# Patient Record
Sex: MALE | Race: WHITE | Employment: PART TIME | URBAN - METROPOLITAN AREA
[De-identification: names, ages, dates, MRNs, and addresses within clinical notes are randomized per-mention and may not be internally consistent; named-entity substitution may affect disease eponyms.]

---

## 2021-12-13 ENCOUNTER — HOSPITAL ENCOUNTER (OUTPATIENT)
Dept: LAB | Age: 66
Discharge: HOME | End: 2021-12-13

## 2021-12-13 ENCOUNTER — OFFICE VISIT (OUTPATIENT)
Dept: HEMATOLOGY | Age: 66
End: 2021-12-13
Payer: COMMERCIAL

## 2021-12-13 VITALS
BODY MASS INDEX: 31.14 KG/M2 | OXYGEN SATURATION: 98 % | HEIGHT: 73 IN | SYSTOLIC BLOOD PRESSURE: 108 MMHG | TEMPERATURE: 97.1 F | DIASTOLIC BLOOD PRESSURE: 72 MMHG | HEART RATE: 79 BPM | WEIGHT: 235 LBS

## 2021-12-13 DIAGNOSIS — K70.31 ALCOHOLIC CIRRHOSIS OF LIVER WITH ASCITES (HCC): Primary | ICD-10-CM

## 2021-12-13 PROBLEM — I48.0 PAROXYSMAL ATRIAL FIBRILLATION (HCC): Status: ACTIVE | Noted: 2021-12-13

## 2021-12-13 PROBLEM — K70.9 ALCOHOLIC LIVER DISEASE (HCC): Status: ACTIVE | Noted: 2021-12-13

## 2021-12-13 PROBLEM — L40.9 PSORIASIS: Status: ACTIVE | Noted: 2021-12-13

## 2021-12-13 PROBLEM — K74.60 CIRRHOSIS (HCC): Status: ACTIVE | Noted: 2021-12-13

## 2021-12-13 PROBLEM — F10.11 ALCOHOL ABUSE, IN REMISSION: Status: ACTIVE | Noted: 2021-12-13

## 2021-12-13 PROBLEM — I85.00 ESOPHAGEAL VARICES (HCC): Status: ACTIVE | Noted: 2021-12-13

## 2021-12-13 PROBLEM — Z79.01 CHRONIC ANTICOAGULATION: Status: ACTIVE | Noted: 2021-12-13

## 2021-12-13 PROBLEM — R18.8 ASCITES: Status: ACTIVE | Noted: 2021-12-13

## 2021-12-13 LAB
AMMONIA PLAS-SCNC: 131 UMOL/L (ref 11–32)
SENTARA SPECIMEN COL,SENBCF: NORMAL

## 2021-12-13 PROCEDURE — 99001 SPECIMEN HANDLING PT-LAB: CPT

## 2021-12-13 PROCEDURE — 99205 OFFICE O/P NEW HI 60 MIN: CPT | Performed by: INTERNAL MEDICINE

## 2021-12-13 PROCEDURE — 82140 ASSAY OF AMMONIA: CPT

## 2021-12-13 RX ORDER — FUROSEMIDE 40 MG/1
80 TABLET ORAL DAILY
Qty: 90 TABLET | Refills: 3
Start: 2021-12-13 | End: 2022-02-02

## 2021-12-13 RX ORDER — APIXABAN 5 MG/1
TABLET, FILM COATED ORAL
COMMUNITY
Start: 2021-12-12

## 2021-12-13 RX ORDER — PANTOPRAZOLE SODIUM 20 MG/1
20 TABLET, DELAYED RELEASE ORAL DAILY
COMMUNITY

## 2021-12-13 RX ORDER — NADOLOL 40 MG/1
40 TABLET ORAL DAILY
COMMUNITY
End: 2022-01-23

## 2021-12-13 RX ORDER — SPIRONOLACTONE 25 MG/1
50 TABLET ORAL
COMMUNITY
End: 2021-12-13

## 2021-12-13 RX ORDER — FUROSEMIDE 40 MG/1
80 TABLET ORAL
COMMUNITY
End: 2021-12-13 | Stop reason: SDUPTHER

## 2021-12-13 RX ORDER — USTEKINUMAB 90 MG/ML
INJECTION, SOLUTION SUBCUTANEOUS
COMMUNITY
Start: 2021-12-09

## 2021-12-13 RX ORDER — SILDENAFIL 100 MG/1
TABLET, FILM COATED ORAL
COMMUNITY
Start: 2021-08-12

## 2021-12-13 RX ORDER — SPIRONOLACTONE 100 MG/1
200 TABLET, FILM COATED ORAL DAILY
Qty: 180 TABLET | Refills: 3 | Status: SHIPPED | OUTPATIENT
Start: 2021-12-13 | End: 2022-01-23

## 2021-12-13 RX ORDER — FLECAINIDE ACETATE 100 MG/1
50 TABLET ORAL EVERY 12 HOURS
COMMUNITY

## 2021-12-13 NOTE — PROGRESS NOTES
[address]      Edith Peabody, MD, Ralph Duke, Yelena Dorado MD, MPH      JL Devi-JULISSA Hassan, [facility]-BC     Wanda Vick, [facility]   Yulia Johnston, FNP-C    Horacio Biswas, [facility]       Beverly Meneses [facility] [place] 136    at [address], [address] [place] [address], [facility] 22.    [phone]    FAX: [address] [place] [address] Avenue    at [facility]    [address], [address]    [address] [facility], [address]    [phone]    FAX: [phone]           Patient Care Team:  Camilla Aguirre MD as PCP - General (Internal Medicine)      Problem List  Date Reviewed: 12/13/2021          Codes Class Noted    Alcoholic liver disease ([facility] 75.) ICD-10-CM: K70.9  ICD-9-CM: 571.3  12/13/2021        Cirrhosis ([facility] ICD-10-CM: K74.60  ICD-9-CM: 571.5  12/13/2021        Ascites ICD-10-CM: R18.8  ICD-9-CM: 789.59  12/13/2021        Esophageal varices ([facility] ICD-10-CM: I85.00  ICD-9-CM: 456.1  12/13/2021        Psoriasis ICD-10-CM: L40.9  ICD-9-CM: 696.1  12/13/2021        Paroxysmal atrial fibrillation (HCC) ICD-10-CM: I48.0  ICD-9-CM: 427.31  12/13/2021        Alcohol abuse, in remission ICD-10-CM: F10.11  ICD-9-CM: 305.03  12/13/2021        Chronic anticoagulation ICD-10-CM: Z79.01  ICD-9-CM: V58.61  12/13/2021                The clinicians listed above have asked me to see Saniya Howell in consultation regarding management of cirrhosis secondary to alcohol. All medical records sent by the referring physicians were reviewed including imaging studies     The patient is a 61 y.o.  male who was found to have chronic liver disease and cirrhosis in 3/2018 when he underwent liver biopsy. Serologic evaluation for markers of chronic liver disease has either not been performed or the results are not available.       The patient has developed the following complications of cirrhosis: esophageal varices, ascites,     The patient has the following symptoms which could be attributed to the liver disorder:    fatigue,   occasional pain in the right side over the liver,   swelling of the abdomen,     The patient is not experiencing the following symptoms which are commonly seen in this liver disorder:   problems concentrating,   swelling of the lower extremities,   hematemesis,   hematochezia. The patient has Mild limitations in functional activities which can be attributed to the liver disease       ASSESSMENT AND PLAN:  Cirrhosis  The diagnosis of cirrhosis is based upon imaging, laboratory studies, complications of cirrhosis. Cirrhosis is secondary to alcohol. Have performed laboratory testing to monitor liver function and degree of liver injury. This included BMP, hepatic panel, CBC with platelet count, INR. AST is elevated. ALT is normal.  ALP is elevated. Total bilirubin is mildly elevated. Serum albumin is depressed. INR is prolonged. The platelet count is depressed. The CTP is 9. Child class B. The MELD score is 17. Serologic testing for causes of chronic liver disease was ordered. All was negative     Elevation in Ferritin  There is an elevation in ferritin with Elevated iron saturation. It is unlikely that the patient has hemochromatosis or is a carrier for an HFE gene. Will order HFE genetic testing. Suspect the elevation in ferritin is secondary to alcohol use and will come down to normal with abstinence. Ascites   Ascites developed for the first time in 3/2021. Ascites is persistent despite the current dose of diuretics. Paracentesis is being performed every 2 weeks. Will increase the dose of diuretics to step 2. The patient was counseled regarding the need to maintain sodium restriction and the types of foods containing high amounts of sodium to be avoided.     Lower extremity edema  Edema is persistent despite current dose of diuretics. Will continue the current dose of diuretics at step 2. Screening for Esophageal varices   The patient has small esophageal varices without prior bleeding. The last EGD to assess for varices was performed in 8/2021. Will schedule for EGD to assess for varices in 2/2022  The patient is on a beta-blocker to reduce the risk of variceal hemorrhage. Will continue this medication until varices have been eradicated. Hepatic encephalopathy   Overt HE has not developed to date. There is no reason for treatment with lactulose of xifaxan    Thrombocytopenia   This is secondary to cirrhosis. There is no evidence of overt bleeding. No treatment is required. The platelet count is adequate for the patient to undergo procedures without the need for platelet transfusion or platelet growth factors. Screening for Hepatocellular Carcinoma  [address] screening has not been not been performed since 3/2021. AFP was ordered today and ultrasound will be scheduled. Treatment of other medical problems in patients with chronic liver disease  There are no contraindications for the patient to take most medications that are necessary for treatment of other medical issues. The patient has cirrhrosis and should avoid taking NSAIDs which are associated with a higher rate of developing TOMA. The patient can take any medications utilized for treatment of DM, statins to treat hypercholesterolemia    Counseling for alcohol in patients with chronic liver disease  The patient was counseled regarding alcohol consumption and the effect of alcohol on chronic liver disease. The patient has not consumed alcohol since 3/2021. Osteoporosis  The risk of osteoporosis is increased in patients with cirrhosis. DEXA bone density to assess for osteoporosis has not been performed. This should be ordered by the patients primary care physician.     Vaccinations Vaccination for viral hepatitis A and B is recommended since the patient has no serologic evidence of previous exposure or vaccination with immunity. The patient has received 2 doses of COVID-19 vaccine. Routine vaccinations against other bacterial and viral agents can be performed as indicated. Annual flu vaccination should be administered if indicated. ALLERGIES  Not on File    MEDICATIONS  Current Outpatient Medications   Medication Sig    Eliquis 5 mg tablet     flecainide (TAMBOCOR) 100 mg tablet Take 50 mg by mouth every twelve (12) hours.  furosemide (LASIX) 40 mg tablet Take 80 mg by mouth.  nadoloL (CORGARD) 40 mg tablet Take 40 mg by mouth daily.  pantoprazole (PROTONIX) 20 mg tablet Take 20 mg by mouth daily.  sildenafil citrate (VIAGRA) 100 mg tablet take 1 tablet by mouth 1 hour prior to intercourse    spironolactone (ALDACTONE) 25 mg tablet Take 50 mg by mouth.  ustekinaumab 90 mg/mL injection      No current facility-administered medications for this visit. SYSTEM REVIEW NOT RELATED TO LIVER DISEASE OR REVIEWED ABOVE:  Constitution systems: Negative for fever, chills, weight gain, weight loss. Eyes: Negative for visual changes. ENT: Negative for sore throat, painful swallowing. Respiratory: Negative for cough, hemoptysis, SOB. Cardiology: Negative for chest pain, palpitations. GI:  Negative for constipation or diarrhea. : Negative for urinary frequency, dysuria, hematuria, nocturia. Skin: Negative for rash. Hematology: Negative for easy bruising, blood clots. Musculo-skelatal: Negative for back pain, muscle pain, weakness. Neurologic: Negative for headaches, dizziness, vertigo, memory problems not related to HE. Psychology: Negative for anxiety, depression. FAMILY HISTORY:  The father  of alcoholism. The mother  of pancreas cancer. There is no family history of liver disease. SOCIAL HISTORY:  The patient is . The patient has 2 children, 3 stepchildren,   The patient has never used tobacco products. The patient has previously consumed alcohol in excess. The patient has been abstinent from alcohol since 3/2021. The patient currently works full time as  for [email or web address]. PHYSICAL EXAMINATION:  Visit Vitals  /72   Pulse 79   Temp 97.1 °F (36.2 °C) (Tympanic)   Ht 6' 1\" (1.854 m)   Wt 235 lb (106.6 kg)   SpO2 98%   BMI 31.00 kg/m²     General: No acute distress. Eyes: Sclera anicteric. ENT: No oral lesions. Thyroid normal.  Nodes: No adenopathy. Skin: No spider angiomata. No jaundice. No palmar erythema. Respiratory: Lungs clear to auscultation. Cardiovascular: Regular heart rate. No murmurs. No JVD. Abdomen: Soft non-tender. Liver size normal to percussion/palpation. Spleen not palpable. No obvious ascites. Extremities: No edema. No muscle wasting. No gross arthritic changes. Neurologic: Alert and oriented. Cranial nerves grossly intact. No asterixis.     LABORATORY STUDIES:  [facility] [place] of [address] [place] [address] 12/13/2021   WBC 4.0 - 11.0 K/uL 8.6   ANC 1.8 - 7.7 K/uL 5.7   HGB 13.1 - 17.2 g/dL 13.3    - 440 K/uL 101 (L)   INR 0.89 - 1.29 1.27   AST 10 - 37 U/L 90 (H)   ALT 5 - 40 U/L 39   Alk Phos 40 - 125 U/L 305 (H)   Bili, Total 0.2 - 1.2 mg/dL 2.3 (H)   Bili, Direct 0.0 - 0.3 mg/dL 1.1 (H)   Albumin 3.5 - 5.0 g/dL 3.2 (L)   BUN 6 - 22 mg/dL 10   Creat 0.8 - 1.6 mg/dL 0.7 (L)   Na 133 - 145 mmol/L 132 (L)   K 3.5 - 5.5 mmol/L 4.1   Cl 98 - 110 mmol/L 96 (L)   CO2 20 - 32 mmol/L 25   Glucose 70 - 99 mg/dL 131 (H)   Ammonia 11 - 32 UMOL/L 131 (H)     SEROLOGIES:  Serologies Latest Ref Rng & Units 12/13/2021   Hep A Ab, Total Negative Negative   Hep B Surface Ag None Detec None Detected   Hep B Core Ab, Total None Detec None Detected   Hep B Surface Ab None Detec None Detected   Ferritin 22 - 322 ng/mL 507 (H)   Iron % Saturation 20 - 50 % 51 (H)   C-ANCA Neg:<1:20 titer <1:20   ANCA Neg:<1:20 titer <1:20   ASMCA 0 - 19 Units 16   M2 Ab 0.0 - 20.0 Units <20.0   Ceruloplasmin 16.0 - 31.0 mg/dL 26.9   Alpha-1 antitrypsin level 101 - 187 mg/dL 184     LIVER HISTOLOGY:  Not available or performed    ENDOSCOPIC PROCEDURES:  8/2021. EGD by Dr Alejandra Walters. Small esophageal varices. NO banding. RADIOLOGY:  3/2021. CT scan abdomen with IV contrast.  Changes consistent with cirrhosis. No liver mass lesions. No dilated bile ducts. Small ascites. OTHER TESTING:  Not available or performed    FOLLOW-UP:  All of the issues listed above in the Assessment and Plan were discussed with the patient. All questions were answered. The patient expressed a clear understanding of the above. [address] [place] [address] in 4 weeks to review all data and determine the treatment plan.       Wolf Leiva MD  [facility] of [address], [address] [facility] 22.  [phone]  [address] [place] [address]

## 2021-12-13 NOTE — Clinical Note
12/25/2021    Patient: Augustus Ibrahim   YOB: 1955   Date of Visit: 12/13/2021     Kelsey Da Silva, [address] [address]  Via Fax: [phone]    Dear Kelsey Da Silva MD,      Thank you for referring Mr. Talya Schmidt to [address] [place] [address],[address] for evaluation. My notes for this consultation are attached. If you have questions, please do not hesitate to call me. I look forward to following your patient along with you.       Sincerely,    Tyler Batista MD

## 2021-12-14 LAB
A-G RATIO,AGRAT: 0.9 RATIO (ref 1.1–2.6)
ABSOLUTE LYMPHOCYTE COUNT, 10803: 1.3 K/UL (ref 1–4.8)
ALBUMIN SERPL-MCNC: 3.2 G/DL (ref 3.5–5)
ALP SERPL-CCNC: 305 U/L (ref 40–125)
ALT SERPL-CCNC: 39 U/L (ref 5–40)
ANION GAP SERPL CALC-SCNC: 11 MMOL/L (ref 3–15)
AST SERPL W P-5'-P-CCNC: 90 U/L (ref 10–37)
BASOPHILS # BLD: 0.1 K/UL (ref 0–0.2)
BASOPHILS NFR BLD: 1 % (ref 0–2)
BILIRUB SERPL-MCNC: 2.3 MG/DL (ref 0.2–1.2)
BILIRUBIN, DIRECT,CBIL: 1.1 MG/DL (ref 0–0.3)
BUN SERPL-MCNC: 10 MG/DL (ref 6–22)
CALCIUM SERPL-MCNC: 8.7 MG/DL (ref 8.4–10.5)
CHLORIDE SERPL-SCNC: 96 MMOL/L (ref 98–110)
CO2 SERPL-SCNC: 25 MMOL/L (ref 20–32)
CREAT SERPL-MCNC: 0.7 MG/DL (ref 0.8–1.6)
EOSINOPHIL # BLD: 0.4 K/UL (ref 0–0.5)
EOSINOPHIL NFR BLD: 5 % (ref 0–6)
ERYTHROCYTE [DISTWIDTH] IN BLOOD BY AUTOMATED COUNT: 14.5 % (ref 10–15.5)
FE % SATURATION,PSAT: 51 % (ref 20–50)
FERRITIN SERPL-MCNC: 507 NG/ML (ref 22–322)
GFRAA, 66117: >60
GFRNA, 66118: >60
GLOBULIN,GLOB: 3.5 G/DL (ref 2–4)
GLUCOSE SERPL-MCNC: 131 MG/DL (ref 70–99)
GRANULOCYTES,GRANS: 66 % (ref 40–75)
HBV SURFACE AB SER RIA-ACNC: NORMAL
HCT VFR BLD AUTO: 39 % (ref 39.3–51.6)
HCV AB SER IA-ACNC: NORMAL
HEP A AB,IGM, 006734: NORMAL
HEP B CORE AB, TOTAL, 006718: NORMAL
HEP B SURFACE AG SCRN, 006510: NORMAL
HGB BLD-MCNC: 13.3 G/DL (ref 13.1–17.2)
INR PPP: 1.27 (ref 0.89–1.29)
IRON,IRN: 110 MCG/DL (ref 45–160)
LYMPHOCYTES, LYMLT: 15 % (ref 20–45)
MCH RBC QN AUTO: 37 PG (ref 26–34)
MCHC RBC AUTO-ENTMCNC: 34 G/DL (ref 31–36)
MCV RBC AUTO: 107 FL (ref 80–95)
MONOCYTES # BLD: 1 K/UL (ref 0.1–1)
MONOCYTES NFR BLD: 12 % (ref 3–12)
NEUTROPHILS # BLD AUTO: 5.7 K/UL (ref 1.8–7.7)
PLATELET # BLD AUTO: 101 K/UL (ref 140–440)
PMV BLD AUTO: 10.8 FL (ref 9–13)
POTASSIUM SERPL-SCNC: 4.1 MMOL/L (ref 3.5–5.5)
PROT SERPL-MCNC: 6.7 G/DL (ref 6.2–8.1)
PROTHROMBIN TIME: 13.2 SEC (ref 9–13)
RBC # BLD AUTO: 3.63 M/UL (ref 3.8–5.8)
SODIUM SERPL-SCNC: 132 MMOL/L (ref 133–145)
TIBC,TIBC: 216 MCG/DL (ref 228–428)
UIBC SERPL-MCNC: 106 MCG/DL (ref 110–370)
WBC # BLD AUTO: 8.6 K/UL (ref 4–11)

## 2021-12-15 LAB
ACTIN (SMOOTH MUSCLE) ANTIBODY, 006644: 16 UNITS (ref 0–19)
AFP, SERUM, 141303: 5 NG/ML (ref 0–8)
AFP-L3%, SERUM, 141302: 7.3 % (ref 0–9.9)
ALPHA-1 ANTITRYPSIN,A1ATR: 184 MG/DL (ref 101–187)
ANA SCREEN, 8017: NEGATIVE
ATYPICAL PANCA: NORMAL TITER
CERULOPLASMIN,CERUL: 26.9 MG/DL (ref 16–31)
CYTOPLASMIC (C-ANCA), 162400: NORMAL TITER
HEP A AB, TOTAL, 006726: NEGATIVE
MICHOCHONDRIAL (M2) AB, 006652: <20 UNITS (ref 0–20)
PERINULCEAR (P-ANCA), 13235: NORMAL TITER

## 2022-01-23 ENCOUNTER — HOSPITAL ENCOUNTER (EMERGENCY)
Age: 67
Discharge: HOME | End: 2022-01-23
Attending: EMERGENCY MEDICINE | Admitting: HOSPITALIST
Payer: COMMERCIAL

## 2022-01-23 VITALS
HEART RATE: 90 BPM | HEIGHT: 74 IN | TEMPERATURE: 98.4 F | SYSTOLIC BLOOD PRESSURE: 130 MMHG | BODY MASS INDEX: 30.16 KG/M2 | DIASTOLIC BLOOD PRESSURE: 65 MMHG | RESPIRATION RATE: 16 BRPM | OXYGEN SATURATION: 98 % | WEIGHT: 235 LBS

## 2022-01-23 DIAGNOSIS — E87.1 ACUTE HYPONATREMIA: ICD-10-CM

## 2022-01-23 DIAGNOSIS — K70.11 ASCITES DUE TO CHRONIC ALCOHOLIC HEPATITIS: Primary | ICD-10-CM

## 2022-01-23 LAB
ALBUMIN SERPL-MCNC: 2.4 G/DL (ref 3.4–5)
ALBUMIN/GLOB SERPL: 0.7 {RATIO} (ref 0.8–1.7)
ALP SERPL-CCNC: 233 U/L (ref 45–117)
ALT SERPL-CCNC: 58 U/L (ref 16–61)
AMMONIA PLAS-SCNC: 73 UMOL/L (ref 11–32)
ANION GAP SERPL CALC-SCNC: 7 MMOL/L (ref 3–18)
AST SERPL-CCNC: 83 U/L (ref 10–38)
BASOPHILS # BLD: 0.1 K/UL (ref 0–0.1)
BASOPHILS NFR BLD: 1 % (ref 0–2)
BILIRUB SERPL-MCNC: 4.1 MG/DL (ref 0.2–1)
BUN SERPL-MCNC: 11 MG/DL (ref 7–18)
BUN/CREAT SERPL: 13 (ref 12–20)
CALCIUM SERPL-MCNC: 7.8 MG/DL (ref 8.5–10.1)
CHLORIDE SERPL-SCNC: 94 MMOL/L (ref 100–111)
CO2 SERPL-SCNC: 21 MMOL/L (ref 21–32)
CREAT SERPL-MCNC: 0.83 MG/DL (ref 0.6–1.3)
DIFFERENTIAL METHOD BLD: ABNORMAL
EOSINOPHIL # BLD: 0.5 K/UL (ref 0–0.4)
EOSINOPHIL NFR BLD: 6 % (ref 0–5)
ERYTHROCYTE [DISTWIDTH] IN BLOOD BY AUTOMATED COUNT: 14.8 % (ref 11.6–14.5)
GLOBULIN SER CALC-MCNC: 3.6 G/DL (ref 2–4)
GLUCOSE SERPL-MCNC: 230 MG/DL (ref 74–99)
HCT VFR BLD AUTO: 32 % (ref 36–48)
HGB BLD-MCNC: 11.3 G/DL (ref 13–16)
IMM GRANULOCYTES # BLD AUTO: 0.2 K/UL (ref 0–0.04)
IMM GRANULOCYTES NFR BLD AUTO: 2 % (ref 0–0.5)
INR PPP: 1.4 (ref 0.8–1.2)
LIPASE SERPL-CCNC: 315 U/L (ref 73–393)
LYMPHOCYTES # BLD: 0.9 K/UL (ref 0.9–3.6)
LYMPHOCYTES NFR BLD: 11 % (ref 21–52)
MAGNESIUM SERPL-MCNC: 1.8 MG/DL (ref 1.6–2.6)
MCH RBC QN AUTO: 36.9 PG (ref 24–34)
MCHC RBC AUTO-ENTMCNC: 35.3 G/DL (ref 31–37)
MCV RBC AUTO: 104.6 FL (ref 78–100)
MONOCYTES # BLD: 1 K/UL (ref 0.05–1.2)
MONOCYTES NFR BLD: 12 % (ref 3–10)
NEUTS SEG # BLD: 5.3 K/UL (ref 1.8–8)
NEUTS SEG NFR BLD: 68 % (ref 40–73)
NRBC # BLD: 0 K/UL (ref 0–0.01)
NRBC BLD-RTO: 0 PER 100 WBC
PLATELET # BLD AUTO: 85 K/UL (ref 135–420)
PLATELET COMMENTS,PCOM: ABNORMAL
PMV BLD AUTO: 9.8 FL (ref 9.2–11.8)
POTASSIUM SERPL-SCNC: 4.5 MMOL/L (ref 3.5–5.5)
PROT SERPL-MCNC: 6 G/DL (ref 6.4–8.2)
PROTHROMBIN TIME: 16.8 SEC (ref 11.5–15.2)
RBC # BLD AUTO: 3.06 M/UL (ref 4.35–5.65)
RBC MORPH BLD: ABNORMAL
SODIUM SERPL-SCNC: 122 MMOL/L (ref 136–145)
WBC # BLD AUTO: 8 K/UL (ref 4.6–13.2)

## 2022-01-23 PROCEDURE — 82140 ASSAY OF AMMONIA: CPT

## 2022-01-23 PROCEDURE — 80053 COMPREHEN METABOLIC PANEL: CPT

## 2022-01-23 PROCEDURE — 83690 ASSAY OF LIPASE: CPT

## 2022-01-23 PROCEDURE — 65270000029 HC RM PRIVATE

## 2022-01-23 PROCEDURE — 85610 PROTHROMBIN TIME: CPT

## 2022-01-23 PROCEDURE — 85025 COMPLETE CBC W/AUTO DIFF WBC: CPT

## 2022-01-23 PROCEDURE — 83735 ASSAY OF MAGNESIUM: CPT

## 2022-01-23 PROCEDURE — 99283 EMERGENCY DEPT VISIT LOW MDM: CPT

## 2022-01-23 RX ORDER — ACETAMINOPHEN 650 MG/1
650 SUPPOSITORY RECTAL
Status: DISCONTINUED | OUTPATIENT
Start: 2022-01-23 | End: 2022-01-23 | Stop reason: HOSPADM

## 2022-01-23 RX ORDER — ALBUMIN HUMAN 250 G/1000ML
25 SOLUTION INTRAVENOUS EVERY 6 HOURS
Status: DISCONTINUED | OUTPATIENT
Start: 2022-01-23 | End: 2022-01-23 | Stop reason: HOSPADM

## 2022-01-23 RX ORDER — SODIUM CHLORIDE 0.9 % (FLUSH) 0.9 %
5-40 SYRINGE (ML) INJECTION AS NEEDED
Status: DISCONTINUED | OUTPATIENT
Start: 2022-01-23 | End: 2022-01-23 | Stop reason: HOSPADM

## 2022-01-23 RX ORDER — FACIAL-BODY WIPES
10 EACH TOPICAL DAILY PRN
Status: DISCONTINUED | OUTPATIENT
Start: 2022-01-23 | End: 2022-01-23 | Stop reason: HOSPADM

## 2022-01-23 RX ORDER — SODIUM CHLORIDE 0.9 % (FLUSH) 0.9 %
5-40 SYRINGE (ML) INJECTION EVERY 8 HOURS
Status: DISCONTINUED | OUTPATIENT
Start: 2022-01-23 | End: 2022-01-23 | Stop reason: HOSPADM

## 2022-01-23 RX ORDER — ACETAMINOPHEN 325 MG/1
650 TABLET ORAL
Status: DISCONTINUED | OUTPATIENT
Start: 2022-01-23 | End: 2022-01-23 | Stop reason: HOSPADM

## 2022-01-23 NOTE — DISCHARGE INSTRUCTIONS
Report for your scheduled paracentesis on Tuesday, January 25th. Limit water intake to try to slowly bring up your sodium level.

## 2022-01-23 NOTE — ED PROVIDER NOTES
EMERGENCY DEPARTMENT HISTORY AND PHYSICAL EXAM      Date: 1/23/2022  Patient Name: Lin Rojas    History of Presenting Illness     Chief Complaint   Patient presents with    Abdominal Pain       History Provided By: Patient    History (Context): Lin Rojas is a 61 y.o. male with alcoholic cirrhosis presents at recommendation from PCP and gastroenterologist for TIPS procedure. Patient having normal abdominal distention and pain, no worse than usual.  He receives weekly paracentesis procedures in [facility] every Tuesday. Denies fevers or chills, nausea or vomiting, change in stools, upper respiratory symptoms. PCP: Priyank Paz MD    Current Outpatient Medications   Medication Sig Dispense Refill    Eliquis 5 mg tablet       flecainide (TAMBOCOR) 100 mg tablet Take 50 mg by mouth every twelve (12) hours.  pantoprazole (PROTONIX) 20 mg tablet Take 20 mg by mouth daily.  sildenafil citrate (VIAGRA) 100 mg tablet take 1 tablet by mouth 1 hour prior to intercourse      ustekinaumab 90 mg/mL injection       furosemide (LASIX) 40 mg tablet Take 2 Tablets by mouth daily. 90 Tablet 3       Past History     Past Medical History:  Past Medical History:   Diagnosis Date    Acid reflux     Atrial fibrillation (HCC)     History of abdominal paracentesis     Liver disease        Past Surgical History:  History reviewed. No pertinent surgical history. Family History:  History reviewed. No pertinent family history. Social History:  Social History     Tobacco Use    Smoking status: Never Smoker    Smokeless tobacco: Never Used   Substance Use Topics    Alcohol use: Not Currently    Drug use: Never       Allergies:  No Known Allergies      Review of Systems   Gen: Denies fever, chills, fatigue  HEENT: Denies congestion, sore throat.   Cardiac: Denies chest pain, palpitations  Pulm: Denies cough, shortness of breath  GI: Denies nausea, vomiting, recent change in stools  : Denies change in urine stream, flank pain  Neuro: Denies headache, dizziness. Ext: Denies peripheral edema, focal extremity pain  Skin: Positive for rash. Physical Exam     Vitals:    01/23/22 1134   BP: 130/65   Pulse: 90   Resp: 16   Temp: 98.4 °F (36.9 °C)   SpO2: 98%   Weight: 106.6 kg (235 lb)   Height: 6' 2\" (1.88 m)       Gen: alert and oriented, in no acute distress. HEENT: Normocephalic, scleral icterus   Cardiovascular: Normal rate, regular rhythm, no murmurs, rubs, gallops. Pulses intact and equal distally. Pulmonary: No respiratory distress. No stridor. Clear lungs. ABD: Soft, diffusely distended but soft, tender to palpation in the periumbilical region, adjacent to protruding soft mass that is reducible, no guarding, no masses noted. There is scattered maculopapular dry lesions to abdomen  Neuro: Alert. Normal speech. Normal mentation. Psych: Normal thought content and thought processes. EXT: Moves all extremities well. No cyanosis or clubbing. Skin: Warm and well-perfused        Diagnostic Study Results     Labs -     Recent Results (from the past 12 hour(s))   CBC WITH AUTOMATED DIFF    Collection Time: 01/23/22 12:06 PM   Result Value Ref Range    WBC 8.0 4.6 - 13.2 K/uL    RBC 3.06 (L) 4.35 - 5.65 M/uL    HGB 11.3 (L) 13.0 - 16.0 g/dL    HCT 32.0 (L) 36.0 - 48.0 %    .6 (H) 78.0 - 100.0 FL    MCH 36.9 (H) 24.0 - 34.0 PG    MCHC 35.3 31.0 - 37.0 g/dL    RDW 14.8 (H) 11.6 - 14.5 %    PLATELET 85 (L) 913 - 420 K/uL    MPV 9.8 9.2 - 11.8 FL    NRBC 0.0 0  WBC    ABSOLUTE NRBC 0.00 0.00 - 0.01 K/uL    NEUTROPHILS 68 40 - 73 %    LYMPHOCYTES 11 (L) 21 - 52 %    MONOCYTES 12 (H) 3 - 10 %    EOSINOPHILS 6 (H) 0 - 5 %    BASOPHILS 1 0 - 2 %    IMMATURE GRANULOCYTES 2 (H) 0.0 - 0.5 %    ABS. NEUTROPHILS 5.3 1.8 - 8.0 K/UL    ABS. LYMPHOCYTES 0.9 0.9 - 3.6 K/UL    ABS. MONOCYTES 1.0 0.05 - 1.2 K/UL    ABS. EOSINOPHILS 0.5 (H) 0.0 - 0.4 K/UL    ABS. BASOPHILS 0.1 0.0 - 0.1 K/UL    ABS. IMM. GRANS. 0.2 (H) 0.00 - 0.04 K/UL    DF AUTOMATED      PLATELET COMMENTS DECREASED PLATELETS      RBC COMMENTS MACROCYTOSIS  1+        RBC COMMENTS POLYCHROMASIA  1+        RBC COMMENTS SPHEROCYTES  1+       METABOLIC PANEL, COMPREHENSIVE    Collection Time: 01/23/22 12:06 PM   Result Value Ref Range    Sodium 122 (L) 136 - 145 mmol/L    Potassium 4.5 3.5 - 5.5 mmol/L    Chloride 94 (L) 100 - 111 mmol/L    CO2 21 21 - 32 mmol/L    Anion gap 7 3.0 - 18 mmol/L    Glucose 230 (H) 74 - 99 mg/dL    BUN 11 7.0 - 18 MG/DL    Creatinine 0.83 0.6 - 1.3 MG/DL    BUN/Creatinine ratio 13 12 - 20      GFR est AA >60 >60 ml/min/1.73m2    GFR est non-AA >60 >60 ml/min/1.73m2    Calcium 7.8 (L) 8.5 - 10.1 MG/DL    Bilirubin, total 4.1 (H) 0.2 - 1.0 MG/DL    ALT (SGPT) 58 16 - 61 U/L    AST (SGOT) 83 (H) 10 - 38 U/L    Alk. phosphatase 233 (H) 45 - 117 U/L    Protein, total 6.0 (L) 6.4 - 8.2 g/dL    Albumin 2.4 (L) 3.4 - 5.0 g/dL    Globulin 3.6 2.0 - 4.0 g/dL    A-G Ratio 0.7 (L) 0.8 - 1.7     LIPASE    Collection Time: 01/23/22 12:06 PM   Result Value Ref Range    Lipase 315 73 - 393 U/L   PROTHROMBIN TIME + INR    Collection Time: 01/23/22 12:06 PM   Result Value Ref Range    Prothrombin time 16.8 (H) 11.5 - 15.2 sec    INR 1.4 (H) 0.8 - 1.2     AMMONIA    Collection Time: 01/23/22 12:06 PM   Result Value Ref Range    Ammonia 73 (H) 11 - 32 UMOL/L       Radiologic Studies -   No orders to display     CT Results  (Last 48 hours)    None        CXR Results  (Last 48 hours)    None            Medical Decision Making   I am the first provider for this patient. I reviewed the vital signs, available nursing notes, past medical history, past surgical history, family history and social history. Vital Signs-Reviewed the patient's vital signs. EKG: Interpreted by myself. Records Reviewed: By myself personally on initial evaluation    MDM:   Patient's clinical presentation most consistent with chronic alcoholic ascites.   Other DDX thought to be less likely but also considered due to high risk condition includes: Appendicitis, SBO, LBO, Mesenteric Ischemia, Cholecystitis, Cholangitis, Necrotizing Pancreatitis, Incarcerated/Strangulated Hernia, Perforated Peptic Ulcer Disease. Plan:  Admit for therapeutic paracentesis, plan for TIPS procedure this week by Dr. Garza Party as below:  Orders Placed This Encounter    CBC WITH AUTOMATED DIFF    COMPREHENSIVE METABOLIC PANEL    LIPASE    PROTHROMBIN TIME + INR    AMMONIA    IP CONSULT TO HOSPITALIST    INITIAL PHYSICIAN ORDER: INPATIENT Medical; No; 3. Patient receiving treatment that can only be provided in an inpatient setting (further clarification in H&P documentation)        ED Course:    Screening laboratory studies, patient declined offer for analgesic agent     1337: Spoke with hospitalist, Dr. Sharan Pereira. Recommended discussing case with Dr. Nitza Conner to confirm he will be available to see the patient in the hospital tomorrow. If not, would not recommend admission to this facility. 1400: Discussed case with Dr. Nitza Conner. States he will be in house tomorrow to see the patient and had planned to perform TIPS procedure this week. 1410: Discussed with hospitalist again, Dr. Sharan Pereira. After she discussed this case with our Chief clinical Officer, due to the absence of any ICU beds at this time, cannot promise the patient a TIPS procedure unless beds open this week. We will admit the patient for acute hyponatremia treatment. After discussing this plan with the patient, he would prefer to be discharged home as he is unlikely to receive the TIPS procedure. He expressed understanding for the risk of not being treated for his hyponatremia including onset of seizure, brain death, respiratory failure. I asked him to speak with the hospitalist first before making a decision. After speaking with Dr. London Juares, patient continued to wish to be discharged home.   We will contact  Kd to schedule elective TIPS procedure once bed situation improving at this facility or at another facility. Patient's condition upon disposition: stable    Disposition:  Discharge home against medical       Procedures:  Procedures      Critical Care Time: 0 min      Diagnosis     Clinical Impression:   1. Ascites due to chronic alcoholic hepatitis    2. Acute hyponatremia        Signed,  Kenya Blair D.O. Emergency Physician  [place]    As a voice dictation software was utilized to dictate this note, minor word transpositions can occur. I apologize for confusing wording and typographic errors. Please feel free to contact me for clarification.

## 2022-01-23 NOTE — CONSULTS
Medicine Consult    Patient:  Jimbo Healy 61 y.o. male  Asked to evaluate patient by Dr. Gay Tolentino   Primary Care Provider:  Antonette Miller MD  Date of Admission:  1/23/2022  Reason for Consult: admission         Assessment/Plan     Hospital Problems  Date Reviewed: 12/13/2021          Codes Class Noted POA    Hyponatremia ICD-10-CM: E87.1  ICD-9-CM: 276.1  1/23/2022 Unknown        Acid reflux ICD-10-CM: K21.9  ICD-9-CM: 530.81  Unknown Unknown        Alcoholic liver disease ([facility] ICD-10-CM: K70.9  ICD-9-CM: 571.3  12/13/2021 Yes        Cirrhosis ([facility] ICD-10-CM: K74.60  ICD-9-CM: 571.5  12/13/2021 Yes        Ascites ICD-10-CM: R18.8  ICD-9-CM: 789.59  12/13/2021 Yes        Esophageal varices ([facility] ICD-10-CM: I85.00  ICD-9-CM: 456.1  12/13/2021 Yes        Paroxysmal atrial fibrillation ([facility] ICD-10-CM: I48.0  ICD-9-CM: 427.31  12/13/2021 Yes        Chronic anticoagulation ICD-10-CM: Z79.01  ICD-9-CM: V58.61  12/13/2021 Yes              PLAN:    Hyponatremia  Hold diuretics, normal saline infusion low rate  Nephrology consult        Alcoholic liver disease  In remission      Cirrhosis  We will give albumin  Due to alcoholic liver disease            A. Fib, chronic ac   Continue home medication, balance electrolytes  On eliquis at home will continue      Thrombocytopenia  Due to cirrhosis  Platelet 85 around his baseline  Continue monitoring        Ascites  Received paracentesis ,  Will do paracentesis as needed  Hold the diuretics due to hyponatremia     Reflux disease   ppi       Pt refused to stay,woud like to sign AMA . Discussed with Dr. Gay Tolentino. HPI:   CC:need procedure   Jimbo Healy is a 61 y.o. male with alcoholic liver disease, cirrhosis, ascites, esophageal varices, anemia, A. fib thrombocytopenia is sent per primary care physician for possible TIPS procedure. Patient visited  Dr. Chante Haynes on December 13, 2021. He has been on diuretics for his ascites.  He received thoracentesis every 2 weeks as outpatient. Last thoracentesis was on January 11, 2022. Primary care physician called ER attending today, informed patient will be sent here for TIPS procedure. He was found hyponatremia 122. Glucose 230, albumin 2.4, hemoglobin 11, INR 1.4 platelet 85  He has scheduled paracentesis on coming Tuesday. Pt is seen and ER. He said he refused to stay if can not do TIPS procedure. Explained to patient, not sure it can be done due to icu bed availability. He will be admitted to the hospital for hyponatremia. He said \"my sodium is always low, I will sign the form, I will not stay\". Case discussed with Dr. Libra Cruz. Past Medical History:   Diagnosis Date    Acid reflux     Atrial fibrillation (HCC)     History of abdominal paracentesis     Liver disease      History reviewed. No pertinent surgical history. Social History     Tobacco Use    Smoking status: Never Smoker    Smokeless tobacco: Never Used   Substance Use Topics    Alcohol use: Not Currently    Drug use: Never     History reviewed. No pertinent family history. No current facility-administered medications on file prior to encounter. Current Outpatient Medications on File Prior to Encounter   Medication Sig Dispense Refill    Eliquis 5 mg tablet       flecainide (TAMBOCOR) 100 mg tablet Take 50 mg by mouth every twelve (12) hours.  pantoprazole (PROTONIX) 20 mg tablet Take 20 mg by mouth daily.  sildenafil citrate (VIAGRA) 100 mg tablet take 1 tablet by mouth 1 hour prior to intercourse      ustekinaumab 90 mg/mL injection       furosemide (LASIX) 40 mg tablet Take 2 Tablets by mouth daily.  90 Tablet 3      No Known Allergies        Review of Systems  Constitutional: No fever, chills, diaphoresis, malaise, fatigue or weight gain/loss or falls  Skin: no itching or rashes  HEENT: no ear discomfort, hearing loss, tinnitus, epistaxis or sore throat  EYES: no blurry vision, double vision or photophobia  CARDIOVASCULAR: no claudication, cp, palpitations, orthopnea, pnd or LE edema  PULMONARY: no cough, wheeze, shortness of breath or sputum production  GI: no nausea, vomiting, diarrhea, abdominal pain, melena, hematemesis or brbpr  : no dysuria, hematuria  MUSCULOSKELETAL: no back pain, joint pain or myalgias  ENDOCRINE: no heat/cold intolerance, polyuria or polydipsia  HEME: no easy bruising or bleeding  NEURO: no unilateral weakness, numbness, tingling or seizures      Physical Exam:      Visit Vitals  /65 (BP 1 Location: Left upper arm, BP Patient Position: Sitting)   Pulse 90   Temp 98.4 °F (36.9 °C)   Resp 16   Ht 6' 2\" (1.88 m)   Wt 106.6 kg (235 lb)   SpO2 98%   BMI 30.17 kg/m²     Body mass index is 30.17 kg/m². Physical Exam:  GEN: well nourished, laying in bed in no acute distress  HEENT: atraumatic, nose normal,oropharynx clear, MMM  NECK: supple, trachea midline, no supraclavicular or submandibular adenopathy noted  EYES: conjuctiva normal, lids with out lesions, PERRL  HEART: RRR with out m/r/g, pmi nondisplaced, pulses 2+ distally  LUNGS: equal chest wall expansion, cta bl with out wheezes/rales or rhonchi  AB: soft, +BS, distended   NEURO: alert, awake and oriented x3, gait not assessed, cranial nerves intact, strength 5/5 bl UE and LE, sensation intact, reflexes nonpathological  SKIN: dry, intact, warm no breakdown noted        Laboratory Studies:      Labs: Results:       Chemistry Recent Labs     01/23/22  1206   *   *   K 4.5   CL 94*   CO2 21   BUN 11   CREA 0.83   CA 7.8*   AGAP 7   BUCR 13   *   TP 6.0*   ALB 2.4*   GLOB 3.6   AGRAT 0.7*      CBC w/Diff Recent Labs     01/23/22  1206   WBC 8.0   RBC 3.06*   HGB 11.3*   HCT 32.0*   PLT 85*   GRANS 68   LYMPH 11*   EOS 6*      Cardiac Enzymes No results for input(s): CPK, CKND1, DANNIELLE in the last 72 hours.     No lab exists for component: CKRMB, TROIP   Coagulation Recent Labs     01/23/22  1206   PTP 16.8*   INR 1.4*       Lipid Panel Lab Results Component Value Date/Time    Cholesterol, total 182 09/13/2021 10:30 AM    HDL Cholesterol 52 09/13/2021 10:30 AM    LDL,Direct 173 (H) 05/30/2019 10:15 AM    LDL, calculated 109 09/13/2021 10:30 AM    Triglyceride 105 09/13/2021 10:30 AM    CHOL/HDL Ratio 3.5 09/13/2021 10:30 AM      BNP No results for input(s): BNPP in the last 72 hours. Liver Enzymes Recent Labs     01/23/22  1206   TP 6.0*   ALB 2.4*   *      Thyroid Studies Lab Results   Component Value Date/Time    TSH 1.870 09/13/2021 10:30 AM            Imaging studies personally reviewed:  Significant Diagnostic Studies: No results found.           Carla Yun MD, Internal Medicine

## 2022-01-23 NOTE — Clinical Note
Status[de-identified] INPATIENT [101]   Type of Bed: Medical [8]   Cardiac Monitoring Required?: No   Inpatient Hospitalization Certified Necessary for the Following Reasons: 3.  Patient receiving treatment that can only be provided in an inpatient setting (further clarification in H&P documentation)   Admitting Diagnosis: Hyponatremia [706510]   Admitting Physician: Franky Monsivais [[id number]]   Attending Physician: Franky Monsivais [[id number]]   Estimated Length of Stay: 2 Midnights   Discharge Plan[de-identified] Home with Office Follow-up

## 2022-01-23 NOTE — ED NOTES
Pt given d/c instructions  Pt verbally understood  Pt sent home. Pt given AMA form pt signed and understands risks  Of leaving.

## 2022-02-02 ENCOUNTER — OFFICE VISIT (OUTPATIENT)
Dept: HEMATOLOGY | Age: 67
End: 2022-02-02
Payer: COMMERCIAL

## 2022-02-02 ENCOUNTER — HOSPITAL ENCOUNTER (OUTPATIENT)
Dept: LAB | Age: 67
Discharge: HOME | End: 2022-02-02

## 2022-02-02 VITALS
HEART RATE: 97 BPM | OXYGEN SATURATION: 98 % | BODY MASS INDEX: 29.98 KG/M2 | DIASTOLIC BLOOD PRESSURE: 65 MMHG | TEMPERATURE: 96.9 F | WEIGHT: 233.5 LBS | SYSTOLIC BLOOD PRESSURE: 134 MMHG

## 2022-02-02 DIAGNOSIS — R60.9 EDEMA, UNSPECIFIED TYPE: ICD-10-CM

## 2022-02-02 DIAGNOSIS — K70.31 ASCITES DUE TO ALCOHOLIC CIRRHOSIS (HCC): Primary | ICD-10-CM

## 2022-02-02 DIAGNOSIS — K70.31 ALCOHOLIC CIRRHOSIS OF LIVER WITH ASCITES (HCC): ICD-10-CM

## 2022-02-02 LAB — SENTARA SPECIMEN COL,SENBCF: NORMAL

## 2022-02-02 PROCEDURE — 99215 OFFICE O/P EST HI 40 MIN: CPT | Performed by: INTERNAL MEDICINE

## 2022-02-02 PROCEDURE — 99001 SPECIMEN HANDLING PT-LAB: CPT

## 2022-02-02 RX ORDER — FUROSEMIDE 40 MG/1
40 TABLET ORAL DAILY
Qty: 90 TABLET | Refills: 3
Start: 2022-02-02 | End: 2022-02-16

## 2022-02-02 RX ORDER — SPIRONOLACTONE 100 MG/1
100 TABLET, FILM COATED ORAL DAILY
Qty: 30 TABLET | Refills: 0
Start: 2022-02-02 | End: 2022-02-16

## 2022-02-02 NOTE — PROGRESS NOTES
[address] [facility]      Ivonne Mathews MD, Kirstie Cabot, Grace Rhoades MD, MPH      OTTO Cortze, [facility]     Wanda Vick, [facility]   Edith Valverde, FNP-C    Allie Alvarez, [facility]       Beverly Meneses [address]    at [address], [address] [place] [address], [facility] 22.    [phone]    FAX: [address] [place] [address] [address], [address] [place] [address],[address]    [address] [place] [address] Street: [phone]           Patient Care Team:  Remi Meigs, MD as PCP - General (Internal Medicine)  Margie Harrington MD (Internal Medicine)      Problem List  Date Reviewed: 2/9/2022          Codes Class Noted    Hyponatremia ICD-10-CM: E87.1  ICD-9-CM: 276.1  1/23/2022        Acid reflux ICD-10-CM: K21.9  ICD-9-CM: 530.81  Unknown        Alcoholic liver disease ([facility] ICD-10-CM: K70.9  ICD-9-CM: 571.3  12/13/2021        Cirrhosis ([facility] ICD-10-CM: K74.60  ICD-9-CM: 571.5  12/13/2021        Ascites ICD-10-CM: R18.8  ICD-9-CM: 789.59  12/13/2021        Esophageal varices ([facility] ICD-10-CM: I85.00  ICD-9-CM: 456.1  12/13/2021        Psoriasis ICD-10-CM: L40.9  ICD-9-CM: 696.1  12/13/2021        Paroxysmal atrial fibrillation ([facility] ICD-10-CM: I48.0  ICD-9-CM: 427.31  12/13/2021        Alcohol abuse, in remission ICD-10-CM: F10.11  ICD-9-CM: 305.03  12/13/2021        Chronic anticoagulation ICD-10-CM: Z79.01  ICD-9-CM: V58.61  12/13/2021                Dori Jesus is being seen at [facility] for management of 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   The active problem list, all pertinent past medical history, medications, liver histology, endoscopic studies,   radiologic findings and laboratory findings related to the liver disorder were reviewed and discussed with the patient. The patient is a 61 y.o.  male who was found to have chronic liver disease and cirrhosis in 3/2018 when he underwent liver biopsy. Serologic evaluation for markers of chronic liver disease was negative. The patient has developed the following complications of cirrhosis: esophageal varices, ascites,   Ascites is refractory to diuretics and he is getting paracentesis weekly. The patient has the following symptoms which could be attributed to the liver disorder:    fatigue,   occasional pain in the right side over the liver,   swelling of the abdomen,     The patient is not experiencing the following symptoms which are commonly seen in this liver disorder:   problems concentrating,   swelling of the lower extremities,   hematemesis,   hematochezia. The patient has Mild limitations in functional activities which can be attributed to the liver disease       ASSESSMENT AND PLAN:  Cirrhosis  The diagnosis of cirrhosis is based upon imaging, laboratory studies, complications of cirrhosis. Cirrhosis is secondary to alcohol. The CTP is 9. Child class B. The MELD score is 24. Alcohol liver disease  The diagnosis is based upon a history of consuming significant alcohol on a daily basis for many years, liver biopsy, pattern of AST>ALT, an elevation in ferritin and FE saturation, serology that is negative for other causes of chronic liver disease. A liver biopsy performed in 3/2018 shows fatty liver consistent with alcohol etiology and Cirrhosis. The patient has been abstinent from alcohol since 3/2021. Elevation in Ferritin  There is an elevation in ferritin with Elevated iron saturation. It is unlikely that the patient has hemochromatosis or is a carrier for an HFE gene. HFE genetic testing was negative  The elevation in ferritin is secondary to alcohol use and will come down to normal with abstinence.     Ascites   Ascites developed for the first time in 3/2021. Ascites is refractory to current doses of diuretics because of hyponatremia. Paracentesis is being performed every 1week. Will need to consider placement of TIPS. Will schedule for ECHO to assess for pulmonary HTN, RV dysfunction and TR. Will shedule for US duplex with contrast to evlaute for PV thrombosis. The patient may not be a candidate for for TIPS shunt because of advanced liver disease with high MELD score. He would need to be hospitalized and treated for hyponatremia which will bring down the MELD score prior to TIPS placement. Lower extremity edema  Edema is persistent despite current dose of diuretics. Will continue the current dose of diuretics at step 2. Screening for Esophageal varices   The patient has small esophageal varices without prior bleeding. The last EGD to assess for varices was performed in 8/2021. Will schedule for EGD to assess for varices in 2/2022  If he gets a TIPS he will not need repeat EGD. Hepatic encephalopathy   Overt HE has not developed to date. There is no reason for treatment with lactulose of xifaxan    Anemia   This is due to multifactorial causes including portal hypertension with chronic GI blood loss, bone marrow suppression secondary to previous alcohol. The most recent FE studies were from 12/2021. The serum ferritin is 507  The FE saturation is 51    Thrombocytopenia   This is secondary to cirrhosis. There is no evidence of overt bleeding. No treatment is required. The platelet count is adequate for the patient to undergo procedures without the need for platelet transfusion or platelet growth factors. Screening for Hepatocellular Carcinoma  [address] screening has not been not been performed since 3/2021. AFP was ordered today and ultrasound will be scheduled.     Treatment of other medical problems in patients with chronic liver disease  There are no contraindications for the patient to take most medications that are necessary for treatment of other medical issues. The patient has cirrhrosis and should avoid taking NSAIDs which are associated with a higher rate of developing TOMA. The patient can take any medications utilized for treatment of DM, statins to treat hypercholesterolemia    Counseling for alcohol in patients with chronic liver disease  The patient was counseled regarding alcohol consumption and the effect of alcohol on chronic liver disease. The patient has not consumed alcohol since 3/2021. Osteoporosis  The risk of osteoporosis is increased in patients with cirrhosis. DEXA bone density to assess for osteoporosis has not been performed. This should be ordered by the patients primary care physician. Vaccinations   Vaccination for viral hepatitis A and B is recommended since the patient has no serologic evidence of previous exposure or vaccination with immunity. The patient has received 2 doses of COVID-19 vaccine. Routine vaccinations against other bacterial and viral agents can be performed as indicated. Annual flu vaccination should be administered if indicated. ALLERGIES  No Known Allergies    MEDICATIONS  Current Outpatient Medications   Medication Sig    furosemide (LASIX) 40 mg tablet Take 1 Tablet by mouth daily.  spironolactone (Aldactone) 100 mg tablet Take 1 Tablet by mouth daily.  Eliquis 5 mg tablet     flecainide (TAMBOCOR) 100 mg tablet Take 50 mg by mouth every twelve (12) hours.  pantoprazole (PROTONIX) 20 mg tablet Take 20 mg by mouth daily.  sildenafil citrate (VIAGRA) 100 mg tablet take 1 tablet by mouth 1 hour prior to intercourse    ustekinaumab 90 mg/mL injection      No current facility-administered medications for this visit. SYSTEM REVIEW NOT RELATED TO LIVER DISEASE OR REVIEWED ABOVE:  Constitution systems: Negative for fever, chills, weight gain, weight loss. Eyes: Negative for visual changes.   ENT: Negative for sore throat, painful swallowing. Respiratory: Negative for cough, hemoptysis, SOB. Cardiology: Negative for chest pain, palpitations. GI:  Negative for constipation or diarrhea. : Negative for urinary frequency, dysuria, hematuria, nocturia. Skin: Negative for rash. Hematology: Negative for easy bruising, blood clots. Musculo-skelatal: Negative for back pain, muscle pain, weakness. Neurologic: Negative for headaches, dizziness, vertigo, memory problems not related to HE. Psychology: Negative for anxiety, depression. FAMILY HISTORY:  The father  of alcoholism. The mother  of pancreas cancer. There is no family history of liver disease. SOCIAL HISTORY:  The patient is . The patient has 2 children, 3 stepchildren,   The patient has never used tobacco products. The patient has previously consumed alcohol in excess. The patient has been abstinent from alcohol since 3/2021. The patient currently works full time as  for [employer]. PHYSICAL EXAMINATION:  Visit Vitals  /65   Pulse 97   Temp 96.9 °F (36.1 °C) (Tympanic)   Wt 233 lb 8 oz (105.9 kg)   SpO2 98%   BMI 29.98 kg/m²     General: No acute distress. Eyes: Sclera anicteric. ENT: No oral lesions. Thyroid normal.  Nodes: No adenopathy. Skin: No spider angiomata. No jaundice. No palmar erythema. Respiratory: Lungs clear to auscultation. Cardiovascular: Regular heart rate. No murmurs. No JVD. Abdomen: Soft non-tender. Liver size normal to percussion/palpation. Spleen not palpable. No obvious ascites. Extremities: No edema. No muscle wasting. No gross arthritic changes. Neurologic: Alert and oriented. Cranial nerves grossly intact. No asterixis.     LABORATORY STUDIES:  Liver [place] of [address] [place] [address] Units 2022   WBC 4.6 - 13.2 K/uL 7.2 11.3 (H)   ANC 1.8 - 8.0 K/UL 4.9    HGB 13.0 - 16.0 g/dL 10.3 (L) 10.7 (L)    - 420 K/uL 84 (L) 76 (L) INR 0.89 - 1.29  1.23   AST 10 - 38 U/L 69 (H) 74 (H)   ALT 16 - 61 U/L 51 45 (H)   Alk Phos 45 - 117 U/L 185 (H) 156 (H)   Bili, Total 0.2 - 1.0 MG/DL 3.6 (H) 6.2 (H)   Bili, Direct 0.0 - 0.3 mg/dL  2.6 (H)   Albumin 3.4 - 5.0 g/dL 2.3 (L) 3.0 (L)   BUN 7.0 - 18 MG/DL 15 20   Creat 0.6 - 1.3 MG/DL 1.06 0.8   Na 136 - 145 mmol/L 123 (L) 117 (LL)   K 3.5 - 5.5 mmol/L 5.1 6.0 (H)   Cl 100 - 111 mmol/L 95 (L) 85 (L)   CO2 21 - 32 mmol/L 23 21   Glucose 74 - 99 mg/dL 221 (H) 119 (H)       SEROLOGIES:  Serologies Latest Ref Rng & Units 12/13/2021   Hep A Ab, Total Negative Negative   Hep B Surface Ag None Detec None Detected   Hep B Core Ab, Total None Detec None Detected   Hep B Surface Ab None Detec None Detected   Ferritin 22 - 322 ng/mL 507 (H)   Iron % Saturation 20 - 50 % 51 (H)   C-ANCA Neg:<1:20 titer <1:20   ANCA Neg:<1:20 titer <1:20   ASMCA 0 - 19 Units 16   M2 Ab 0.0 - 20.0 Units <20.0   Ceruloplasmin 16.0 - 31.0 mg/dL 26.9   Alpha-1 antitrypsin level 101 - 187 mg/dL 184     LIVER HISTOLOGY:  Not available or performed    ENDOSCOPIC PROCEDURES:  8/2021. EGD by Dr Rachel Cevallos. Small esophageal varices. NO banding. RADIOLOGY:  3/2021. CT scan abdomen with IV contrast.  Changes consistent with cirrhosis. No liver mass lesions. No dilated bile ducts. Small ascites. OTHER TESTING:  Not available or performed    FOLLOW-UP:  All of the issues listed above in the Assessment and Plan were discussed with the patient. All questions were answered. The patient expressed a clear understanding of the above. [address] [place] [address] in 4 weeks to review all data and determine the treatment plan.       Drena Guadeloupe, MD  [address] [facility] [address] [place]  22.  [phone]  [address] [place] [address]

## 2022-02-02 NOTE — Clinical Note
2/9/2022    Patient: Piter Coronado   YOB: 1955   Date of Visit: 2/2/2022     Amaya Melton MD  [address] [place] [address] [address]  Via Fax: [phone]    Dear Amaya Melton MD,      Thank you for referring Mr. Kady Monteiro to [address] [place] [address],[address] for evaluation. My notes for this consultation are attached. If you have questions, please do not hesitate to call me. I look forward to following your patient along with you.       Sincerely,    Kary Jones MD

## 2022-02-03 LAB
A-G RATIO,AGRAT: 1.3 RATIO (ref 1.1–2.6)
ALBUMIN SERPL-MCNC: 3 G/DL (ref 3.5–5)
ALP SERPL-CCNC: 156 U/L (ref 40–125)
ALT SERPL-CCNC: 45 U/L (ref 5–40)
ANION GAP SERPL CALC-SCNC: 11 MMOL/L (ref 3–15)
AST SERPL W P-5'-P-CCNC: 74 U/L (ref 10–37)
BANDS%-DIF,2015: 2 % (ref 0–5)
BILIRUB SERPL-MCNC: 6.2 MG/DL (ref 0.2–1.2)
BILIRUBIN, DIRECT,CBIL: 2.6 MG/DL (ref 0–0.3)
BUN SERPL-MCNC: 20 MG/DL (ref 6–22)
CALCIUM SERPL-MCNC: 8.7 MG/DL (ref 8.4–10.5)
CHLORIDE SERPL-SCNC: 85 MMOL/L (ref 98–110)
CO2 SERPL-SCNC: 21 MMOL/L (ref 20–32)
CREAT SERPL-MCNC: 0.8 MG/DL (ref 0.8–1.6)
EOS ABS-DIF,2069: 0.3 K/UL (ref 0–0.5)
EOSINOPHILS C MAN (DIFF), 1067: 3 % (ref 0–6)
ERYTHROCYTE [DISTWIDTH] IN BLOOD BY AUTOMATED COUNT: 14.7 % (ref 10–15.5)
GFRAA, 66117: >60
GFRNA, 66118: >60
GLOBULIN,GLOB: 2.4 G/DL (ref 2–4)
GLUCOSE SERPL-MCNC: 119 MG/DL (ref 70–99)
HCT VFR BLD AUTO: 30.3 % (ref 39.3–51.6)
HGB BLD-MCNC: 10.7 G/DL (ref 13.1–17.2)
IMMATURE PLATELET FRACTION: 3.8 % (ref 1.1–7.1)
INR PPP: 1.23 (ref 0.89–1.29)
LYMPHOCYTES C MAN (DIFF), 1065: 8 % (ref 20–45)
LYMPHS ABS-DIF,2105: 0.9 K/UL (ref 1–4.8)
MACROCYTOSIS,MACRO: ABNORMAL
MCH RBC QN AUTO: 38 PG (ref 26–34)
MCHC RBC AUTO-ENTMCNC: 35 G/DL (ref 31–36)
MCV RBC AUTO: 108 FL (ref 80–95)
MONOCYTES ABS-DIF,2141: 1.4 K/UL (ref 0.1–1)
MONOCYTES C MAN (DIFF), 1066: 13 % (ref 3–12)
NEUTROPHILS ABS,2156: 8.5 K/UL (ref 1.8–7.7)
NEUTROPHILS C MAN (DIFF), 1064: 74 % (ref 40–75)
NORMOCHROMIC CELLAVISION, 1078: ABNORMAL
PLATELET # BLD AUTO: 76 K/UL (ref 140–440)
PMV BLD AUTO: 10.1 FL (ref 9–13)
POTASSIUM SERPL-SCNC: 6 MMOL/L (ref 3.5–5.5)
PROT SERPL-MCNC: 5.4 G/DL (ref 6.2–8.1)
PROTHROMBIN TIME: 12.8 SEC (ref 9–13)
RBC # BLD AUTO: 2.81 M/UL (ref 3.8–5.8)
SMEAR EVAL, 1131: ABNORMAL
SODIUM SERPL-SCNC: 117 MMOL/L (ref 133–145)
WBC # BLD AUTO: 11.3 K/UL (ref 4–11)

## 2022-02-04 ENCOUNTER — HOSPITAL ENCOUNTER (EMERGENCY)
Age: 67
Discharge: HOME | End: 2022-02-04
Attending: EMERGENCY MEDICINE
Payer: COMMERCIAL

## 2022-02-04 VITALS
RESPIRATION RATE: 11 BRPM | BODY MASS INDEX: 29.52 KG/M2 | SYSTOLIC BLOOD PRESSURE: 109 MMHG | DIASTOLIC BLOOD PRESSURE: 58 MMHG | OXYGEN SATURATION: 100 % | TEMPERATURE: 97.5 F | HEIGHT: 74 IN | HEART RATE: 103 BPM | WEIGHT: 230 LBS

## 2022-02-04 DIAGNOSIS — K76.9 LIVER DISEASE: Primary | ICD-10-CM

## 2022-02-04 LAB
ALBUMIN SERPL-MCNC: 2.3 G/DL (ref 3.4–5)
ALBUMIN/GLOB SERPL: 0.7 {RATIO} (ref 0.8–1.7)
ALP SERPL-CCNC: 185 U/L (ref 45–117)
ALT SERPL-CCNC: 51 U/L (ref 16–61)
ANION GAP SERPL CALC-SCNC: 5 MMOL/L (ref 3–18)
APPEARANCE UR: CLEAR
AST SERPL-CCNC: 69 U/L (ref 10–38)
BASOPHILS # BLD: 0.1 K/UL (ref 0–0.1)
BASOPHILS NFR BLD: 1 % (ref 0–2)
BILIRUB SERPL-MCNC: 3.6 MG/DL (ref 0.2–1)
BILIRUB UR QL: ABNORMAL
BUN SERPL-MCNC: 15 MG/DL (ref 7–18)
BUN/CREAT SERPL: 14 (ref 12–20)
CALCIUM SERPL-MCNC: 8.1 MG/DL (ref 8.5–10.1)
CHLORIDE SERPL-SCNC: 95 MMOL/L (ref 100–111)
CO2 SERPL-SCNC: 23 MMOL/L (ref 21–32)
COLOR UR: ABNORMAL
CREAT SERPL-MCNC: 1.06 MG/DL (ref 0.6–1.3)
DIFFERENTIAL METHOD BLD: ABNORMAL
EOSINOPHIL # BLD: 0.4 K/UL (ref 0–0.4)
EOSINOPHIL NFR BLD: 6 % (ref 0–5)
ERYTHROCYTE [DISTWIDTH] IN BLOOD BY AUTOMATED COUNT: 14.2 % (ref 11.6–14.5)
ETHANOL SERPL-MCNC: <3 MG/DL (ref 0–3)
GLOBULIN SER CALC-MCNC: 3.3 G/DL (ref 2–4)
GLUCOSE SERPL-MCNC: 221 MG/DL (ref 74–99)
GLUCOSE UR STRIP.AUTO-MCNC: 250 MG/DL
HCT VFR BLD AUTO: 29.6 % (ref 36–48)
HGB BLD-MCNC: 10.3 G/DL (ref 13–16)
HGB UR QL STRIP: NEGATIVE
IMM GRANULOCYTES # BLD AUTO: 0.1 K/UL (ref 0–0.04)
IMM GRANULOCYTES NFR BLD AUTO: 1 % (ref 0–0.5)
KETONES UR QL STRIP.AUTO: ABNORMAL MG/DL
LACTATE SERPL-SCNC: 2.4 MMOL/L (ref 0.4–2)
LEUKOCYTE ESTERASE UR QL STRIP.AUTO: NEGATIVE
LYMPHOCYTES # BLD: 0.8 K/UL (ref 0.9–3.6)
LYMPHOCYTES NFR BLD: 12 % (ref 21–52)
MCH RBC QN AUTO: 36.5 PG (ref 24–34)
MCHC RBC AUTO-ENTMCNC: 34.8 G/DL (ref 31–37)
MCV RBC AUTO: 105 FL (ref 78–100)
MONOCYTES # BLD: 0.9 K/UL (ref 0.05–1.2)
MONOCYTES NFR BLD: 12 % (ref 3–10)
NEUTS SEG # BLD: 4.9 K/UL (ref 1.8–8)
NEUTS SEG NFR BLD: 68 % (ref 40–73)
NITRITE UR QL STRIP.AUTO: NEGATIVE
NRBC # BLD: 0 K/UL (ref 0–0.01)
NRBC BLD-RTO: 0 PER 100 WBC
PH UR STRIP: 5.5 [PH] (ref 5–8)
PLATELET # BLD AUTO: 84 K/UL (ref 135–420)
PMV BLD AUTO: 10 FL (ref 9.2–11.8)
POTASSIUM SERPL-SCNC: 5.1 MMOL/L (ref 3.5–5.5)
PROT SERPL-MCNC: 5.6 G/DL (ref 6.4–8.2)
PROT UR STRIP-MCNC: NEGATIVE MG/DL
RBC # BLD AUTO: 2.82 M/UL (ref 4.35–5.65)
SODIUM SERPL-SCNC: 123 MMOL/L (ref 136–145)
SP GR UR REFRACTOMETRY: 1.02 (ref 1–1.03)
UROBILINOGEN UR QL STRIP.AUTO: 1 EU/DL (ref 0.2–1)
WBC # BLD AUTO: 7.2 K/UL (ref 4.6–13.2)

## 2022-02-04 PROCEDURE — 99285 EMERGENCY DEPT VISIT HI MDM: CPT

## 2022-02-04 PROCEDURE — 83605 ASSAY OF LACTIC ACID: CPT

## 2022-02-04 PROCEDURE — 82077 ASSAY SPEC XCP UR&BREATH IA: CPT

## 2022-02-04 PROCEDURE — 93005 ELECTROCARDIOGRAM TRACING: CPT

## 2022-02-04 PROCEDURE — 80053 COMPREHEN METABOLIC PANEL: CPT

## 2022-02-04 PROCEDURE — 85025 COMPLETE CBC W/AUTO DIFF WBC: CPT

## 2022-02-04 PROCEDURE — 87040 BLOOD CULTURE FOR BACTERIA: CPT

## 2022-02-04 PROCEDURE — 81003 URINALYSIS AUTO W/O SCOPE: CPT

## 2022-02-04 NOTE — ED TRIAGE NOTES
Pt presents from [facility] for abn Na (118)and K (6.1). Pt asymptomatic at this time. Pt sent by Dr. Zay Adames. Scheduled for paracentesis today. Last was 1/28.    H/o afib, alcoholic cirrhosis w/ ascites

## 2022-02-04 NOTE — ED PROVIDER NOTES
68-year-old male past medical history of A. fib on anticoagulation and liver cirrhosis presents to the emergency department with abnormal labs. It was reported patient was on diuretic and caused him to be hyponatremic with a sodium of 118. Also patient had elevated potassium. Patient has no complaints in ER. He was sent in to be admitted his gastroenterologist works this hospital.           Past Medical History:   Diagnosis Date    Acid reflux     Atrial fibrillation ([address]     History of abdominal paracentesis     Liver disease        No past surgical history on file. No family history on file. Social History     Socioeconomic History    Marital status:      Spouse name: Not on file    Number of children: Not on file    Years of education: Not on file    Highest education level: Not on file   Occupational History    Not on file   Tobacco Use    Smoking status: Never Smoker    Smokeless tobacco: Never Used   Substance and Sexual Activity    Alcohol use: Not Currently    Drug use: Never    Sexual activity: Not on file   Other Topics Concern    Not on file   Social History Narrative    Not on file     Social Determinants of Health     Financial Resource Strain:     Difficulty of Paying Living Expenses: Not on file   Food Insecurity:     Worried About Running Out of Food in the Last Year: Not on file    Alin of Food in the Last Year: Not on file   Transportation Needs:     Lack of Transportation (Medical): Not on file    Lack of Transportation (Non-Medical):  Not on file   Physical Activity:     Days of Exercise per Week: Not on file    Minutes of Exercise per Session: Not on file   Stress:     Feeling of Stress : Not on file   Social Connections:     Frequency of Communication with Friends and Family: Not on file    Frequency of Social Gatherings with Friends and Family: Not on file    Attends Sabianist Services: Not on file    Active Member of Clubs or Organizations: Not on file    Attends Club or Organization Meetings: Not on file    Marital Status: Not on file   Intimate Partner Violence:     Fear of Current or Ex-Partner: Not on file    Emotionally Abused: Not on file    Physically Abused: Not on file    Sexually Abused: Not on file   Housing Stability:     Unable to Pay for Housing in the Last Year: Not on file    Number of [place] in the Last Year: Not on file    Unstable Housing in the Last Year: Not on file         ALLERGIES: Patient has no known allergies. Review of Systems   Constitutional: Negative. HENT: Negative. Eyes: Negative. Respiratory: Negative. Cardiovascular: Negative. Gastrointestinal: Positive for abdominal distention. Genitourinary: Negative. Neurological: Negative. Hematological: Negative. Psychiatric/Behavioral: Negative. All other systems reviewed and are negative. Vitals:    02/04/22 1227 02/04/22 1325 02/04/22 1326 02/04/22 1327   BP: (!) 120/58      Pulse: (!) 109      Resp: 15      Temp: 97.5 °F (36.4 °C)      SpO2: 99% 99% 100% 100%   Weight: 104.3 kg (230 lb)      Height: 6' 2\" (1.88 m)               Physical Exam  Vitals and nursing note reviewed. Constitutional:       Appearance: Normal appearance. HENT:      Head: Normocephalic and atraumatic. Nose: Nose normal.      Mouth/Throat:      Mouth: Mucous membranes are dry. Eyes:      Extraocular Movements: Extraocular movements intact. Pupils: Pupils are equal, round, and reactive to light. Cardiovascular:      Rate and Rhythm: Normal rate and regular rhythm. Pulses: Normal pulses. Heart sounds: Normal heart sounds. No murmur heard. No friction rub. No gallop. Abdominal:      General: There is distension. Palpations: Abdomen is soft. There is no mass. Tenderness: There is no abdominal tenderness. There is no right CVA tenderness, left CVA tenderness, guarding or rebound.    Musculoskeletal:         General: Normal range of motion. Cervical back: Normal range of motion and neck supple. Skin:     General: Skin is warm. Capillary Refill: Capillary refill takes less than 2 seconds. Neurological:      General: No focal deficit present. Mental Status: He is alert and oriented to person, place, and time. MDM  Number of Diagnoses or Management Options  Diagnosis management comments: I called patient's gastroenterologist and reviewed labs. GI felt that patient could go home he said he will call him tonight.        Amount and/or Complexity of Data Reviewed  Clinical lab tests: ordered and reviewed  Tests in the medicine section of CPT®: ordered and reviewed  Discuss the patient with other providers: yes    Risk of Complications, Morbidity, and/or Mortality  Presenting problems: moderate  Diagnostic procedures: moderate  Management options: moderate    Patient Progress  Patient progress: improved         Procedures

## 2022-02-04 NOTE — ED NOTES
I have reviewed discharge instructions with the patient. The patient verbalized understanding. Walked to [facility] and d/c to home.

## 2022-02-05 LAB
ATRIAL RATE: 102 BPM
CALCULATED P AXIS, ECG09: 37 DEGREES
CALCULATED R AXIS, ECG10: -38 DEGREES
CALCULATED T AXIS, ECG11: 38 DEGREES
DIAGNOSIS, 93000: NORMAL
P-R INTERVAL, ECG05: 168 MS
Q-T INTERVAL, ECG07: 332 MS
QRS DURATION, ECG06: 96 MS
QTC CALCULATION (BEZET), ECG08: 432 MS
VENTRICULAR RATE, ECG03: 102 BPM

## 2022-02-07 LAB — HEREDITARY  HEMOCHROMATOSIS, 551366: NORMAL

## 2022-02-08 ENCOUNTER — TRANSCRIBE ORDER (OUTPATIENT)
Dept: INTERVENTIONAL RADIOLOGY/VASCULAR | Age: 67
End: 2022-02-08

## 2022-02-08 DIAGNOSIS — I48.91 ATRIAL FIBRILLATION (HCC): Primary | ICD-10-CM

## 2022-02-08 DIAGNOSIS — R18.8 ASCITES: Primary | ICD-10-CM

## 2022-02-08 DIAGNOSIS — R18.8 ASCITES: ICD-10-CM

## 2022-02-09 ENCOUNTER — TRANSCRIBE ORDER (OUTPATIENT)
Dept: INTERVENTIONAL RADIOLOGY/VASCULAR | Age: 67
End: 2022-02-09

## 2022-02-09 DIAGNOSIS — K70.31 ASCITES DUE TO ALCOHOLIC CIRRHOSIS (HCC): Primary | ICD-10-CM

## 2022-02-09 NOTE — PROGRESS NOTES
Left second message for pt to call to confirm appointments. Pt to go to [facility] for Echo in AM, and then come to [facility] in afternoon for US and consult.

## 2022-02-10 LAB
BACTERIA SPEC CULT: NORMAL
BACTERIA SPEC CULT: NORMAL
SERVICE CMNT-IMP: NORMAL
SERVICE CMNT-IMP: NORMAL

## 2022-02-11 ENCOUNTER — TRANSCRIBE ORDER (OUTPATIENT)
Dept: INTERVENTIONAL RADIOLOGY/VASCULAR | Age: 67
End: 2022-02-11

## 2022-02-11 DIAGNOSIS — K70.31 ASCITES DUE TO ALCOHOLIC CIRRHOSIS (HCC): Primary | ICD-10-CM

## 2022-02-11 DIAGNOSIS — Z95.828 S/P TIPS (TRANSJUGULAR INTRAHEPATIC PORTOSYSTEMIC SHUNT): Primary | ICD-10-CM

## 2022-02-11 RX ORDER — SODIUM CHLORIDE 9 MG/ML
20 INJECTION, SOLUTION INTRAVENOUS CONTINUOUS
Status: CANCELLED | OUTPATIENT
Start: 2022-02-11

## 2022-02-11 RX ORDER — CEFAZOLIN SODIUM/WATER 2 G/20 ML
2 SYRINGE (ML) INTRAVENOUS
Status: CANCELLED | OUTPATIENT
Start: 2022-02-11 | End: 2022-02-11

## 2022-02-14 ENCOUNTER — HOSPITAL ENCOUNTER (OUTPATIENT)
Dept: LAB | Age: 67
Discharge: HOME | End: 2022-02-14
Payer: COMMERCIAL

## 2022-02-14 ENCOUNTER — HOSPITAL ENCOUNTER (INPATIENT)
Age: 67
LOS: 2 days | Discharge: HOME | DRG: 982 | End: 2022-02-16
Attending: EMERGENCY MEDICINE | Admitting: HOSPITALIST
Payer: COMMERCIAL

## 2022-02-14 ENCOUNTER — HOSPITAL ENCOUNTER (OUTPATIENT)
Dept: ULTRASOUND IMAGING | Age: 67
Discharge: HOME | End: 2022-02-14
Attending: INTERNAL MEDICINE
Payer: COMMERCIAL

## 2022-02-14 ENCOUNTER — HOSPITAL ENCOUNTER (OUTPATIENT)
Dept: INTERVENTIONAL RADIOLOGY/VASCULAR | Age: 67
Discharge: HOME | End: 2022-02-14
Attending: RADIOLOGY
Payer: COMMERCIAL

## 2022-02-14 ENCOUNTER — HOSPITAL ENCOUNTER (OUTPATIENT)
Dept: NON INVASIVE DIAGNOSTICS | Age: 67
Discharge: HOME | End: 2022-02-14
Attending: INTERNAL MEDICINE
Payer: COMMERCIAL

## 2022-02-14 VITALS
WEIGHT: 230 LBS | SYSTOLIC BLOOD PRESSURE: 130 MMHG | HEIGHT: 74 IN | DIASTOLIC BLOOD PRESSURE: 74 MMHG | BODY MASS INDEX: 29.52 KG/M2

## 2022-02-14 DIAGNOSIS — K70.31 ASCITES DUE TO ALCOHOLIC CIRRHOSIS (HCC): ICD-10-CM

## 2022-02-14 DIAGNOSIS — F10.11 ALCOHOL ABUSE, IN REMISSION: ICD-10-CM

## 2022-02-14 DIAGNOSIS — Z95.828 S/P TIPS (TRANSJUGULAR INTRAHEPATIC PORTOSYSTEMIC SHUNT): ICD-10-CM

## 2022-02-14 DIAGNOSIS — E87.5 ACUTE HYPERKALEMIA: Primary | ICD-10-CM

## 2022-02-14 DIAGNOSIS — E87.1 HYPONATREMIA: ICD-10-CM

## 2022-02-14 DIAGNOSIS — K70.31 ALCOHOLIC CIRRHOSIS OF LIVER WITH ASCITES (HCC): ICD-10-CM

## 2022-02-14 DIAGNOSIS — R18.8 ASCITES: ICD-10-CM

## 2022-02-14 DIAGNOSIS — K70.9 ALCOHOLIC LIVER DISEASE (HCC): ICD-10-CM

## 2022-02-14 DIAGNOSIS — R60.9 EDEMA, UNSPECIFIED TYPE: ICD-10-CM

## 2022-02-14 LAB
ABO + RH BLD: NORMAL
ALBUMIN SERPL-MCNC: 2.4 G/DL (ref 3.4–5)
ALBUMIN/GLOB SERPL: 0.7 {RATIO} (ref 0.8–1.7)
ALP SERPL-CCNC: 172 U/L (ref 45–117)
ALT SERPL-CCNC: 55 U/L (ref 16–61)
AMMONIA PLAS-SCNC: 60 UMOL/L (ref 11–32)
ANION GAP SERPL CALC-SCNC: 8 MMOL/L (ref 3–18)
ANION GAP SERPL CALC-SCNC: 8 MMOL/L (ref 3–18)
APTT PPP: 40.5 SEC (ref 23–36.4)
AST SERPL-CCNC: 82 U/L (ref 10–38)
BASOPHILS # BLD: 0.1 K/UL (ref 0–0.1)
BASOPHILS NFR BLD: 1 % (ref 0–2)
BILIRUB SERPL-MCNC: 4.1 MG/DL (ref 0.2–1)
BLOOD GROUP ANTIBODIES SERPL: NORMAL
BUN SERPL-MCNC: 32 MG/DL (ref 7–18)
BUN SERPL-MCNC: 32 MG/DL (ref 7–18)
BUN/CREAT SERPL: 22 (ref 12–20)
BUN/CREAT SERPL: 23 (ref 12–20)
CALCIUM SERPL-MCNC: 8.5 MG/DL (ref 8.5–10.1)
CALCIUM SERPL-MCNC: 8.9 MG/DL (ref 8.5–10.1)
CHLORIDE SERPL-SCNC: 94 MMOL/L (ref 100–111)
CHLORIDE SERPL-SCNC: 94 MMOL/L (ref 100–111)
CK MB CFR SERPL CALC: 4.8 % (ref 0–4)
CK MB SERPL-MCNC: 4.2 NG/ML (ref 5–25)
CK SERPL-CCNC: 87 U/L (ref 39–308)
CO2 SERPL-SCNC: 22 MMOL/L (ref 21–32)
CO2 SERPL-SCNC: 22 MMOL/L (ref 21–32)
CREAT SERPL-MCNC: 1.39 MG/DL (ref 0.6–1.3)
CREAT SERPL-MCNC: 1.45 MG/DL (ref 0.6–1.3)
DIFFERENTIAL METHOD BLD: ABNORMAL
ECHO AO ROOT DIAM: 3.9 CM
ECHO AO ROOT INDEX: 1.69 CM/M2
ECHO AV AREA PEAK VELOCITY: 2.4 CM2
ECHO AV AREA PEAK VELOCITY: 2.6 CM2
ECHO AV AREA VTI: 2.2 CM2
ECHO AV AREA VTI: 2.3 CM2
ECHO AV MEAN GRADIENT: 6 MMHG
ECHO AV MEAN VELOCITY: 1.2 M/S
ECHO AV PEAK GRADIENT: 10 MMHG
ECHO AV PEAK VELOCITY: 1.5 M/S
ECHO AV VELOCITY RATIO: 0.8
ECHO AV VTI: 30.8 CM
ECHO EST RA PRESSURE: 3 MMHG
ECHO LA DIAMETER INDEX: 2.08 CM/M2
ECHO LA DIAMETER: 4.8 CM
ECHO LA TO AORTIC ROOT RATIO: 1.23
ECHO LA VOL 2C: 56 ML (ref 18–58)
ECHO LA VOL 4C: 37 ML (ref 18–58)
ECHO LA VOL BP: 52 ML (ref 18–58)
ECHO LA VOL/BSA BIPLANE: 23 ML/M2 (ref 16–34)
ECHO LA VOLUME AREA LENGTH: 54 ML
ECHO LA VOLUME INDEX A2C: 24 ML/M2 (ref 16–34)
ECHO LA VOLUME INDEX A4C: 16 ML/M2 (ref 16–34)
ECHO LA VOLUME INDEX AREA LENGTH: 23 ML/M2 (ref 16–34)
ECHO LV E' LATERAL VELOCITY: 9 CM/S
ECHO LV E' SEPTAL VELOCITY: 7 CM/S
ECHO LV EDV A2C: 85 ML
ECHO LV EDV A4C: 137 ML
ECHO LV EDV BP: 108 ML (ref 67–155)
ECHO LV EDV INDEX A4C: 59 ML/M2
ECHO LV EDV INDEX BP: 47 ML/M2
ECHO LV EDV NDEX A2C: 37 ML/M2
ECHO LV EJECTION FRACTION A2C: 59 %
ECHO LV EJECTION FRACTION A4C: 64 %
ECHO LV EJECTION FRACTION BIPLANE: 60 % (ref 55–100)
ECHO LV ESV A2C: 35 ML
ECHO LV ESV A4C: 49 ML
ECHO LV ESV BP: 43 ML (ref 22–58)
ECHO LV ESV INDEX A2C: 15 ML/M2
ECHO LV ESV INDEX A4C: 21 ML/M2
ECHO LV ESV INDEX BP: 19 ML/M2
ECHO LV FRACTIONAL SHORTENING: 32 % (ref 28–44)
ECHO LV GLOBAL LONGITUDINAL STRAIN (GLS): -19.2 %
ECHO LV INTERNAL DIMENSION DIASTOLE INDEX: 2.42 CM/M2
ECHO LV INTERNAL DIMENSION DIASTOLIC: 5.6 CM (ref 4.2–5.9)
ECHO LV INTERNAL DIMENSION SYSTOLIC INDEX: 1.65 CM/M2
ECHO LV INTERNAL DIMENSION SYSTOLIC: 3.8 CM
ECHO LV IVSD: 1 CM (ref 0.6–1)
ECHO LV MASS 2D: 234.3 G (ref 88–224)
ECHO LV MASS INDEX 2D: 101.4 G/M2 (ref 49–115)
ECHO LV POSTERIOR WALL DIASTOLIC: 1.1 CM (ref 0.6–1)
ECHO LV RELATIVE WALL THICKNESS RATIO: 0.39
ECHO LVOT AREA: 3.5 CM2
ECHO LVOT AV VTI INDEX: 0.71
ECHO LVOT DIAM: 2.1 CM
ECHO LVOT MEAN GRADIENT: 4 MMHG
ECHO LVOT PEAK GRADIENT: 6 MMHG
ECHO LVOT PEAK VELOCITY: 1.2 M/S
ECHO LVOT STROKE VOLUME INDEX: 32.8 ML/M2
ECHO LVOT SV: 75.8 ML
ECHO LVOT VTI: 21.9 CM
ECHO MV A VELOCITY: 0.66 M/S
ECHO MV E DECELERATION TIME (DT): 431 MS
ECHO MV E VELOCITY: 0.53 M/S
ECHO MV E/A RATIO: 0.8
ECHO MV E/E' LATERAL: 5.89
ECHO MV E/E' RATIO (AVERAGED): 6.73
ECHO MV E/E' SEPTAL: 7.57
ECHO RIGHT VENTRICULAR SYSTOLIC PRESSURE (RVSP): 40 MMHG
ECHO RV FREE WALL PEAK S': 22 CM/S
ECHO RV INTERNAL DIMENSION: 4.1 CM
ECHO RV TAPSE: 3.3 CM (ref 1.5–2)
ECHO TV REGURGITANT MAX VELOCITY: 3.05 M/S
ECHO TV REGURGITANT PEAK GRADIENT: 37 MMHG
EOSINOPHIL # BLD: 0.4 K/UL (ref 0–0.4)
EOSINOPHIL NFR BLD: 5 % (ref 0–5)
ERYTHROCYTE [DISTWIDTH] IN BLOOD BY AUTOMATED COUNT: 14.5 % (ref 11.6–14.5)
ERYTHROCYTE [DISTWIDTH] IN BLOOD BY AUTOMATED COUNT: 14.6 % (ref 11.6–14.5)
GLOBULIN SER CALC-MCNC: 3.4 G/DL (ref 2–4)
GLUCOSE SERPL-MCNC: 126 MG/DL (ref 74–99)
GLUCOSE SERPL-MCNC: 204 MG/DL (ref 74–99)
HCT VFR BLD AUTO: 31.5 % (ref 36–48)
HCT VFR BLD AUTO: 32.9 % (ref 36–48)
HGB BLD-MCNC: 11.3 G/DL (ref 13–16)
HGB BLD-MCNC: 11.7 G/DL (ref 13–16)
IMM GRANULOCYTES # BLD AUTO: 0.2 K/UL (ref 0–0.04)
IMM GRANULOCYTES NFR BLD AUTO: 2 % (ref 0–0.5)
INR PPP: 1.4 (ref 0.8–1.2)
LYMPHOCYTES # BLD: 1 K/UL (ref 0.9–3.6)
LYMPHOCYTES NFR BLD: 11 % (ref 21–52)
MAGNESIUM SERPL-MCNC: 2.1 MG/DL (ref 1.6–2.6)
MCH RBC QN AUTO: 36.8 PG (ref 24–34)
MCH RBC QN AUTO: 37.5 PG (ref 24–34)
MCHC RBC AUTO-ENTMCNC: 35.6 G/DL (ref 31–37)
MCHC RBC AUTO-ENTMCNC: 35.9 G/DL (ref 31–37)
MCV RBC AUTO: 102.6 FL (ref 78–100)
MCV RBC AUTO: 105.4 FL (ref 78–100)
MONOCYTES # BLD: 1 K/UL (ref 0.05–1.2)
MONOCYTES NFR BLD: 12 % (ref 3–10)
NEUTS SEG # BLD: 5.8 K/UL (ref 1.8–8)
NEUTS SEG NFR BLD: 69 % (ref 40–73)
NRBC # BLD: 0 K/UL (ref 0–0.01)
NRBC # BLD: 0 K/UL (ref 0–0.01)
NRBC BLD-RTO: 0 PER 100 WBC
NRBC BLD-RTO: 0 PER 100 WBC
PLATELET # BLD AUTO: 103 K/UL (ref 135–420)
PLATELET # BLD AUTO: 92 K/UL (ref 135–420)
PMV BLD AUTO: 9.4 FL (ref 9.2–11.8)
PMV BLD AUTO: 9.7 FL (ref 9.2–11.8)
POTASSIUM SERPL-SCNC: 5.8 MMOL/L (ref 3.5–5.5)
POTASSIUM SERPL-SCNC: 6.3 MMOL/L (ref 3.5–5.5)
PROT SERPL-MCNC: 5.8 G/DL (ref 6.4–8.2)
PROTHROMBIN TIME: 16.4 SEC (ref 11.5–15.2)
RBC # BLD AUTO: 3.07 M/UL (ref 4.35–5.65)
RBC # BLD AUTO: 3.12 M/UL (ref 4.35–5.65)
SODIUM SERPL-SCNC: 124 MMOL/L (ref 136–145)
SODIUM SERPL-SCNC: 124 MMOL/L (ref 136–145)
SPECIMEN EXP DATE BLD: NORMAL
TROPONIN-HIGH SENSITIVITY: 18 NG/L (ref 0–78)
WBC # BLD AUTO: 8.4 K/UL (ref 4.6–13.2)
WBC # BLD AUTO: 9.2 K/UL (ref 4.6–13.2)

## 2022-02-14 PROCEDURE — 76978 US TRGT DYN MBUBB 1ST LES: CPT

## 2022-02-14 PROCEDURE — 85027 COMPLETE CBC AUTOMATED: CPT

## 2022-02-14 PROCEDURE — 84484 ASSAY OF TROPONIN QUANT: CPT

## 2022-02-14 PROCEDURE — 85730 THROMBOPLASTIN TIME PARTIAL: CPT

## 2022-02-14 PROCEDURE — 74011250637 HC RX REV CODE- 250/637: Performed by: HOSPITALIST

## 2022-02-14 PROCEDURE — 65660000000 HC RM CCU STEPDOWN

## 2022-02-14 PROCEDURE — 82140 ASSAY OF AMMONIA: CPT

## 2022-02-14 PROCEDURE — 80053 COMPREHEN METABOLIC PANEL: CPT

## 2022-02-14 PROCEDURE — 82550 ASSAY OF CK (CPK): CPT

## 2022-02-14 PROCEDURE — 85025 COMPLETE CBC W/AUTO DIFF WBC: CPT

## 2022-02-14 PROCEDURE — 93356 MYOCRD STRAIN IMG SPCKL TRCK: CPT

## 2022-02-14 PROCEDURE — 36415 COLL VENOUS BLD VENIPUNCTURE: CPT

## 2022-02-14 PROCEDURE — 99285 EMERGENCY DEPT VISIT HI MDM: CPT

## 2022-02-14 PROCEDURE — 83735 ASSAY OF MAGNESIUM: CPT

## 2022-02-14 PROCEDURE — 86900 BLOOD TYPING SEROLOGIC ABO: CPT

## 2022-02-14 PROCEDURE — 85610 PROTHROMBIN TIME: CPT

## 2022-02-14 PROCEDURE — 82553 CREATINE MB FRACTION: CPT

## 2022-02-14 PROCEDURE — 74011000250 HC RX REV CODE- 250: Performed by: HOSPITALIST

## 2022-02-14 PROCEDURE — 83930 ASSAY OF BLOOD OSMOLALITY: CPT

## 2022-02-14 PROCEDURE — P9047 ALBUMIN (HUMAN), 25%, 50ML: HCPCS | Performed by: HOSPITALIST

## 2022-02-14 PROCEDURE — 74011250636 HC RX REV CODE- 250/636: Performed by: HOSPITALIST

## 2022-02-14 PROCEDURE — 74011636637 HC RX REV CODE- 636/637: Performed by: HOSPITALIST

## 2022-02-14 PROCEDURE — 74011250636 HC RX REV CODE- 250/636: Performed by: INTERNAL MEDICINE

## 2022-02-14 PROCEDURE — 93005 ELECTROCARDIOGRAM TRACING: CPT

## 2022-02-14 RX ORDER — FACIAL-BODY WIPES
10 EACH TOPICAL DAILY PRN
Status: DISCONTINUED | OUTPATIENT
Start: 2022-02-14 | End: 2022-02-16 | Stop reason: HOSPADM

## 2022-02-14 RX ORDER — SODIUM POLYSTYRENE SULFONATE 15 G/60ML
15 SUSPENSION ORAL; RECTAL
Status: COMPLETED | OUTPATIENT
Start: 2022-02-14 | End: 2022-02-14

## 2022-02-14 RX ORDER — SODIUM CHLORIDE 0.9 % (FLUSH) 0.9 %
5-40 SYRINGE (ML) INJECTION AS NEEDED
Status: DISCONTINUED | OUTPATIENT
Start: 2022-02-14 | End: 2022-02-16 | Stop reason: HOSPADM

## 2022-02-14 RX ORDER — PROMETHAZINE HYDROCHLORIDE 25 MG/1
12.5 TABLET ORAL
Status: DISCONTINUED | OUTPATIENT
Start: 2022-02-14 | End: 2022-02-16 | Stop reason: HOSPADM

## 2022-02-14 RX ORDER — ACETAMINOPHEN 325 MG/1
650 TABLET ORAL
Status: DISCONTINUED | OUTPATIENT
Start: 2022-02-14 | End: 2022-02-16 | Stop reason: HOSPADM

## 2022-02-14 RX ORDER — DEXTROSE 50 % IN WATER (D50W) INTRAVENOUS SYRINGE
25
Status: COMPLETED | OUTPATIENT
Start: 2022-02-14 | End: 2022-02-14

## 2022-02-14 RX ORDER — ACETAMINOPHEN 650 MG/1
650 SUPPOSITORY RECTAL
Status: DISCONTINUED | OUTPATIENT
Start: 2022-02-14 | End: 2022-02-16 | Stop reason: HOSPADM

## 2022-02-14 RX ORDER — PANTOPRAZOLE SODIUM 40 MG/1
40 TABLET, DELAYED RELEASE ORAL DAILY
Status: DISCONTINUED | OUTPATIENT
Start: 2022-02-15 | End: 2022-02-16 | Stop reason: HOSPADM

## 2022-02-14 RX ORDER — ALBUMIN HUMAN 250 G/1000ML
25 SOLUTION INTRAVENOUS EVERY 6 HOURS
Status: DISCONTINUED | OUTPATIENT
Start: 2022-02-14 | End: 2022-02-16 | Stop reason: HOSPADM

## 2022-02-14 RX ORDER — SODIUM CHLORIDE 9 MG/ML
50 INJECTION, SOLUTION INTRAVENOUS CONTINUOUS
Status: DISCONTINUED | OUTPATIENT
Start: 2022-02-14 | End: 2022-02-16 | Stop reason: HOSPADM

## 2022-02-14 RX ORDER — FLECAINIDE ACETATE 50 MG/1
50 TABLET ORAL EVERY 12 HOURS
Status: DISCONTINUED | OUTPATIENT
Start: 2022-02-14 | End: 2022-02-16 | Stop reason: HOSPADM

## 2022-02-14 RX ORDER — CALCIUM GLUCONATE 20 MG/ML
1 INJECTION, SOLUTION INTRAVENOUS ONCE
Status: COMPLETED | OUTPATIENT
Start: 2022-02-14 | End: 2022-02-14

## 2022-02-14 RX ORDER — SODIUM CHLORIDE 0.9 % (FLUSH) 0.9 %
5-40 SYRINGE (ML) INJECTION EVERY 8 HOURS
Status: DISCONTINUED | OUTPATIENT
Start: 2022-02-14 | End: 2022-02-16 | Stop reason: HOSPADM

## 2022-02-14 RX ORDER — ONDANSETRON 2 MG/ML
4 INJECTION INTRAMUSCULAR; INTRAVENOUS
Status: DISCONTINUED | OUTPATIENT
Start: 2022-02-14 | End: 2022-02-14

## 2022-02-14 RX ORDER — FUROSEMIDE 10 MG/ML
20 INJECTION INTRAMUSCULAR; INTRAVENOUS ONCE
Status: COMPLETED | OUTPATIENT
Start: 2022-02-14 | End: 2022-02-14

## 2022-02-14 RX ADMIN — ALBUMIN (HUMAN) 25 G: 0.25 INJECTION, SOLUTION INTRAVENOUS at 20:17

## 2022-02-14 RX ADMIN — DEXTROSE MONOHYDRATE 25 G: 25 INJECTION, SOLUTION INTRAVENOUS at 20:46

## 2022-02-14 RX ADMIN — SULFUR HEXAFLUORIDE 2.4 ML: KIT at 16:00

## 2022-02-14 RX ADMIN — SODIUM CHLORIDE, PRESERVATIVE FREE 10 ML: 5 INJECTION INTRAVENOUS at 23:22

## 2022-02-14 RX ADMIN — SODIUM CHLORIDE 50 ML/HR: 900 INJECTION, SOLUTION INTRAVENOUS at 23:32

## 2022-02-14 RX ADMIN — INSULIN HUMAN 10 UNITS: 100 INJECTION, SOLUTION PARENTERAL at 20:48

## 2022-02-14 RX ADMIN — ALBUMIN (HUMAN) 25 G: 0.25 INJECTION, SOLUTION INTRAVENOUS at 23:20

## 2022-02-14 RX ADMIN — CALCIUM GLUCONATE 1000 MG: 20 INJECTION, SOLUTION INTRAVENOUS at 20:22

## 2022-02-14 RX ADMIN — FLECAINIDE ACETATE 50 MG: 100 TABLET ORAL at 23:20

## 2022-02-14 RX ADMIN — FUROSEMIDE 20 MG: 10 INJECTION, SOLUTION INTRAMUSCULAR; INTRAVENOUS at 20:21

## 2022-02-14 RX ADMIN — SODIUM POLYSTYRENE SULFONATE 15 G: 15 SUSPENSION ORAL; RECTAL at 20:20

## 2022-02-14 NOTE — PROGRESS NOTES
Writing RN attempted to call Dr. Torrey Salazar office with critical K+ result, unable to reach anyone in office. Writing RN hand delivered critical K+ result verifying pt's information per policy to front office staff at [facility], office staff stated that they will give results to Dr. Parish Simms now, staff left desk to deliver message, writing RN provided call back number to radiology nursing.

## 2022-02-14 NOTE — ED PROVIDER NOTES
EMERGENCY DEPARTMENT HISTORY AND PHYSICAL EXAM    Date: 2/14/2022  Patient Name: Wendie Fajardo    History of Presenting Illness     Chief Complaint   Patient presents with    Abnormal Lab Results         History Provided By: Patient    Chief Complaint: abnormal labs       Additional History (Context):   4:02 PM  Wendie Fajardo is a 61 y.o. male with PMHX A. fib, alcoholic cirrhosis, hyponatremia, ESRD, esophageal varices, portal hypertension presents to the emergency department C/O abnormal labs. Patient was in the hospital for TIPS procedure and lab work showed hyponatremia and hyperkalemia. The radiologist wanted him admitted to the hospital.     PCP: Debora Gupta MD    Current Facility-Administered Medications   Medication Dose Route Frequency Provider Last Rate Last Admin    furosemide (LASIX) injection 20 mg  20 mg IntraVENous Genoveva Majano MD        sodium polystyrene (KAYEXALATE) 15 gram/60 mL oral suspension 15 g  15 g Oral NOW Tess Hutchison MD        albumin human 25% (BUMINATE) solution 25 g  25 g IntraVENous Q6H Tess Hutchison MD         Current Outpatient Medications   Medication Sig Dispense Refill    furosemide (LASIX) 40 mg tablet Take 1 Tablet by mouth daily. 90 Tablet 3    spironolactone (Aldactone) 100 mg tablet Take 1 Tablet by mouth daily. 30 Tablet 0    Eliquis 5 mg tablet       flecainide (TAMBOCOR) 100 mg tablet Take 50 mg by mouth every twelve (12) hours.  pantoprazole (PROTONIX) 20 mg tablet Take 20 mg by mouth daily.  sildenafil citrate (VIAGRA) 100 mg tablet take 1 tablet by mouth 1 hour prior to intercourse      ustekinaumab 90 mg/mL injection          Past History     Past Medical History:  Past Medical History:   Diagnosis Date    Acid reflux        Past Surgical History:  No past surgical history on file. Family History:  No family history on file.     Social History:  Social History     Tobacco Use    Smoking status: Never Smoker    Smokeless tobacco: Never Used Substance Use Topics    Alcohol use: Not Currently    Drug use: Never       Allergies:  No Known Allergies    Review of Systems   Review of Systems   Constitutional: Negative for chills and fever. Respiratory: Negative for shortness of breath. Cardiovascular: Negative for chest pain. Gastrointestinal: Negative for abdominal pain, constipation, diarrhea and vomiting. Musculoskeletal: Negative for back pain. Skin: Negative for rash. Neurological: Negative for weakness and numbness. All other systems reviewed and are negative. Physical Exam     Vitals:    02/14/22 1611 02/14/22 1612 02/14/22 1652   BP:  137/65 130/65   Pulse:  92 85   Resp:  20 16   Temp:  97.3 °F (36.3 °C)    SpO2:  100% 100%   Weight: 100.7 kg (222 lb 0.1 oz)     Height: 6' 2\" (1.88 m)       Physical Exam  Vitals and nursing note reviewed. Constitutional:       Appearance: He is well-developed. Comments: Well-appearing nontoxic no acute distress   HENT:      Head: Normocephalic and atraumatic. Cardiovascular:      Rate and Rhythm: Normal rate and regular rhythm. Heart sounds: Normal heart sounds. No murmur heard. Pulmonary:      Effort: Pulmonary effort is normal. No respiratory distress. Breath sounds: Normal breath sounds. No wheezing or rales. Abdominal:      General: Bowel sounds are normal. There is distension. Palpations: Abdomen is soft. Tenderness: There is no abdominal tenderness. Musculoskeletal:      Cervical back: Normal range of motion and neck supple. Neurological:      Mental Status: He is alert and oriented to person, place, and time.    Psychiatric:         Judgment: Judgment normal.         Diagnostic Study Results     Labs:     Recent Results (from the past 12 hour(s))   ECHO ADULT COMPLETE    Collection Time: 02/14/22  2:07 PM   Result Value Ref Range    IVSd 1.0 0.6 - 1.0 cm    LVIDd 5.6 4.2 - 5.9 cm    LVIDs 3.8 cm    LVOT Diameter 2.1 cm    LVPWd 1.1 (A) 0.6 - 1.0 cm    LVOT SV 75.8 ml    Global Longitudinal Strain -19.2 %    EF BP 60 55 - 100 %    LV Ejection Fraction A2C 59 %    LV Ejection Fraction A4C 64 %    LV EDV A2C 85 mL    LV EDV A4C 137 mL    LV EDV  67 - 155 mL    LV ESV A2C 35 mL    LV ESV A4C 49 mL    LV ESV BP 43 22 - 58 mL    LVOT Peak Gradient 6 mmHg    LVOT Mean Gradient 4 mmHg    LVOT Peak Velocity 1.2 m/s    LVOT VTI 21.9 cm    RVIDd 4.1 cm    RV Free Wall Peak S' 22 cm/s    LA Diameter 4.8 cm    LA Volume A/L 54 mL    LA Volume 2C 56 18 - 58 mL    LA Volume 4C 37 18 - 58 mL    LA Volume BP 52 18 - 58 mL    AV Area by Peak Velocity 2.6 cm2    AV Area by Peak Velocity 2.4 cm2    AV Area by VTI 2.3 cm2    AV Area by VTI 2.2 cm2    AV Peak Gradient 10 mmHg    AV Mean Gradient 6 mmHg    AV Peak Velocity 1.5 m/s    AV Mean Velocity 1.2 m/s    AV VTI 30.8 cm    MV A Velocity 0.66 m/s    MV E Wave Deceleration Time 431.0 ms    MV E Velocity 0.53 m/s    LV E' Lateral Velocity 9 cm/s    LV E' Septal Velocity 7 cm/s    TAPSE 3.3 (A) 1.5 - 2.0 cm    TR Peak Gradient 37 mmHg    TR Max Velocity 3.05 m/s    Aortic Root 3.9 cm    Fractional Shortening 2D 32 28 - 44 %    LV ESV Index BP 19 mL/m2    LV ESV Index A4C 21 mL/m2    LV EDV Index A4C 59 mL/m2    LV ESV Index A2C 15 mL/m2    LV EDV Index A2C 37 mL/m2    LVIDd Index 2.42 cm/m2    LVIDs Index 1.65 cm/m2    LV RWT Ratio 0.39     LV Mass 2D 234.3 (A) 88 - 224 g    LV Mass 2D Index 101.4 49 - 115 g/m2    MV E/A 0.80     E/E' Ratio (Averaged) 6.73     E/E' Lateral 5.89     E/E' Septal 7.57     LA Volume Index A/L 23 16 - 34 mL/m2    LVOT Stroke Volume Index 32.8 mL/m2    LVOT Area 3.5 cm2    LA Volume Index 2C 24 16 - 34 mL/m2    LA Volume Index 4C 16 16 - 34 mL/m2    LA Size Index 2.08 cm/m2    LA/AO Root Ratio 1.23     Ao Root Index 1.69 cm/m2    AV Velocity Ratio 0.80     LVOT:AV VTI Index 0.71     LV EDV Index BP 47 mL/m2    LA Volume Index BP 23 16 - 34 ml/m2    Est. RA Pressure 3 mmHg    RVSP 40 mmHg PROTHROMBIN TIME + INR    Collection Time: 02/14/22  2:10 PM   Result Value Ref Range    Prothrombin time 16.4 (H) 11.5 - 15.2 sec    INR 1.4 (H) 0.8 - 1.2     PTT    Collection Time: 02/14/22  2:10 PM   Result Value Ref Range    aPTT 40.5 (H) 23.0 - 36.4 SEC   CBC W/O DIFF    Collection Time: 02/14/22  2:10 PM   Result Value Ref Range    WBC 9.2 4.6 - 13.2 K/uL    RBC 3.12 (L) 4.35 - 5.65 M/uL    HGB 11.7 (L) 13.0 - 16.0 g/dL    HCT 32.9 (L) 36.0 - 48.0 %    .4 (H) 78.0 - 100.0 FL    MCH 37.5 (H) 24.0 - 34.0 PG    MCHC 35.6 31.0 - 37.0 g/dL    RDW 14.5 11.6 - 14.5 %    PLATELET 92 (L) 106 - 420 K/uL    MPV 9.4 9.2 - 11.8 FL    NRBC 0.0 0  WBC    ABSOLUTE NRBC 0.00 0.00 - 8.03 K/uL   METABOLIC PANEL, BASIC    Collection Time: 02/14/22  2:10 PM   Result Value Ref Range    Sodium 124 (L) 136 - 145 mmol/L    Potassium 6.3 (HH) 3.5 - 5.5 mmol/L    Chloride 94 (L) 100 - 111 mmol/L    CO2 22 21 - 32 mmol/L    Anion gap 8 3.0 - 18 mmol/L    Glucose 126 (H) 74 - 99 mg/dL    BUN 32 (H) 7.0 - 18 MG/DL    Creatinine 1.39 (H) 0.6 - 1.3 MG/DL    BUN/Creatinine ratio 23 (H) 12 - 20      GFR est AA >60 >60 ml/min/1.73m2    GFR est non-AA 51 (L) >60 ml/min/1.73m2    Calcium 8.9 8.5 - 10.1 MG/DL   TYPE & SCREEN    Collection Time: 02/14/22  2:10 PM   Result Value Ref Range    Crossmatch Expiration 02/24/2022,2359     ABO/Rh(D) Tally Stade POSITIVE     Antibody screen NEG    TROPONIN-HIGH SENSITIVITY    Collection Time: 02/14/22  4:15 PM   Result Value Ref Range    Troponin-High Sensitivity 18 0 - 78 ng/L   AMMONIA    Collection Time: 02/14/22  4:15 PM   Result Value Ref Range    Ammonia 60 (H) 11 - 32 UMOL/L   CBC WITH AUTOMATED DIFF    Collection Time: 02/14/22  4:29 PM   Result Value Ref Range    WBC 8.4 4.6 - 13.2 K/uL    RBC 3.07 (L) 4.35 - 5.65 M/uL    HGB 11.3 (L) 13.0 - 16.0 g/dL    HCT 31.5 (L) 36.0 - 48.0 %    .6 (H) 78.0 - 100.0 FL    MCH 36.8 (H) 24.0 - 34.0 PG    MCHC 35.9 31.0 - 37.0 g/dL    RDW 14.6 (H) 11.6 - 14.5 %    PLATELET 828 (L) 578 - 420 K/uL    MPV 9.7 9.2 - 11.8 FL    NRBC 0.0 0  WBC    ABSOLUTE NRBC 0.00 0.00 - 0.01 K/uL    NEUTROPHILS 69 40 - 73 %    LYMPHOCYTES 11 (L) 21 - 52 %    MONOCYTES 12 (H) 3 - 10 %    EOSINOPHILS 5 0 - 5 %    BASOPHILS 1 0 - 2 %    IMMATURE GRANULOCYTES 2 (H) 0.0 - 0.5 %    ABS. NEUTROPHILS 5.8 1.8 - 8.0 K/UL    ABS. LYMPHOCYTES 1.0 0.9 - 3.6 K/UL    ABS. MONOCYTES 1.0 0.05 - 1.2 K/UL    ABS. EOSINOPHILS 0.4 0.0 - 0.4 K/UL    ABS. BASOPHILS 0.1 0.0 - 0.1 K/UL    ABS. IMM. GRANS. 0.2 (H) 0.00 - 0.04 K/UL    DF AUTOMATED     CK-MB,QT    Collection Time: 02/14/22  5:27 PM   Result Value Ref Range    CK - MB 4.2 (H) <3.6 ng/ml    CK-MB Index 4.8 (H) 0.0 - 4.0 %   CK    Collection Time: 02/14/22  5:27 PM   Result Value Ref Range    CK 87 39 - 308 U/L   MAGNESIUM    Collection Time: 02/14/22  5:27 PM   Result Value Ref Range    Magnesium 2.1 1.6 - 2.6 mg/dL   METABOLIC PANEL, COMPREHENSIVE    Collection Time: 02/14/22  5:27 PM   Result Value Ref Range    Sodium 124 (L) 136 - 145 mmol/L    Potassium 5.8 (H) 3.5 - 5.5 mmol/L    Chloride 94 (L) 100 - 111 mmol/L    CO2 22 21 - 32 mmol/L    Anion gap 8 3.0 - 18 mmol/L    Glucose 204 (H) 74 - 99 mg/dL    BUN 32 (H) 7.0 - 18 MG/DL    Creatinine 1.45 (H) 0.6 - 1.3 MG/DL    BUN/Creatinine ratio 22 (H) 12 - 20      GFR est AA 59 (L) >60 ml/min/1.73m2    GFR est non-AA 49 (L) >60 ml/min/1.73m2    Calcium 8.5 8.5 - 10.1 MG/DL    Bilirubin, total 4.1 (H) 0.2 - 1.0 MG/DL    ALT (SGPT) 55 16 - 61 U/L    AST (SGOT) 82 (H) 10 - 38 U/L    Alk. phosphatase 172 (H) 45 - 117 U/L    Protein, total 5.8 (L) 6.4 - 8.2 g/dL    Albumin 2.4 (L) 3.4 - 5.0 g/dL    Globulin 3.4 2.0 - 4.0 g/dL    A-G Ratio 0.7 (L) 0.8 - 1.7         Radiologic Studies:   No orders to display     CT Results  (Last 48 hours)    None        CXR Results  (Last 48 hours)    None          Medical Decision Making   I am the first provider for this patient.     I reviewed the vital signs, available nursing notes, past medical history, past surgical history, family history and social history. Vital Signs: Reviewed the patient's vital signs. Pulse Oximetry Analysis: 100% on RA    EKG interpretation: (Preliminary)  4:02 PM   Normal sinus rhythm rate 87 bpm left axis deviation no STEMI  EKG read by Nitish Duran PA-C at 4:18 PM       Records Reviewed: Nursing Notes, Old Medical Records and Previous electrocardiograms    Procedures:  Procedures    ED Course:   4:02 PM Initial assessment performed. The patients presenting problems have been discussed, and they are in agreement with the care plan formulated and outlined with them. I have encouraged them to ask questions as they arise throughout their visit. Core Measures:  For Hospitalized Patients:    1. Hospitalization Decision Time:  The decision to hospitalize the patient was made by Nitish Duran PA-C   at 7:08 PM on 2/14/2022    2. Aspirin: Aspirin was not given because the patient did not present with a stroke at the time of their Emergency Department evaluation    7:08 PM  Patient is being admitted to the hospital by Luis Alfredo Jonas. The results of their tests and reasons for their admission have been discussed with them and/or available family. They convey agreement and understanding for the need to be admitted and for their admission diagnosis. CONDITIONS ON ADMISSION:  Sepsis is not present at the time of admission. Deep Vein Thrombosis is not present at the time of admission. Thrombosis is not present at the time of admission. Urinary Tract Infection is not present at the time of admission. Pneumonia is not present at the time of admission. MRSA is not present at the time of admission. Wound infection is not present at the time of admission. Pressure Ulcer is not present at the time of admission. CLINICAL IMPRESSION:    1. Acute hyperkalemia    2. Hyponatremia    3.  Ascites due to alcoholic cirrhosis (HCC) Discussion:  Pt presents with abnormal labs patient was being evaluated for TIPS procedure and found to be hyperkalemic and hyponatremic. He has no complaints at this time. No EKG changes. Dr. Cierra Bustillo is aware and would like him admitted. Diagnosis and Disposition     admit    CLINICAL IMPRESSION:    1. Acute hyperkalemia    2. Hyponatremia    3. Ascites due to alcoholic cirrhosis [facility])        PLAN:  1. admit       Please note that this dictation was completed with [employer], the computer voice recognition software. Quite often unanticipated grammatical, syntax, homophones, and other interpretive errors are inadvertently transcribed by the computer software. Please disregard these errors. Please excuse any errors that have escaped final proofreading.

## 2022-02-14 NOTE — Clinical Note
Status[de-identified] INPATIENT [101]   Type of Bed: Telemetry [19]   Cardiac Monitoring Required?: Yes   Inpatient Hospitalization Certified Necessary for the Following Reasons: 3.  Patient receiving treatment that can only be provided in an inpatient setting (further clarification in H&P documentation)   Admitting Diagnosis: Hyponatremia [030527]   Admitting Diagnosis: Hyperkalemia [171391]   Admitting Physician: Inder Edmond [[id number]]   Attending Physician: Inder Edmond [[id number]]   Estimated Length of Stay: 2 Midnights   Discharge Plan[de-identified] Home with Office Follow-up

## 2022-02-14 NOTE — PROGRESS NOTES
Critical lab value reported by FIDE Luz at 1515, potassium 6.3. This RN notified Dr. Gurpreet Valenzuela of result as well as attempts to notify Dr. Ekaterina Martínez.

## 2022-02-15 ENCOUNTER — APPOINTMENT (OUTPATIENT)
Dept: ULTRASOUND IMAGING | Age: 67
DRG: 982 | End: 2022-02-15
Attending: HOSPITALIST
Payer: COMMERCIAL

## 2022-02-15 ENCOUNTER — APPOINTMENT (OUTPATIENT)
Dept: INTERVENTIONAL RADIOLOGY/VASCULAR | Age: 67
DRG: 982 | End: 2022-02-15
Attending: INTERNAL MEDICINE
Payer: COMMERCIAL

## 2022-02-15 ENCOUNTER — ANESTHESIA EVENT (OUTPATIENT)
Dept: INTERVENTIONAL RADIOLOGY/VASCULAR | Age: 67
DRG: 982 | End: 2022-02-15
Payer: COMMERCIAL

## 2022-02-15 ENCOUNTER — ANESTHESIA (OUTPATIENT)
Dept: INTERVENTIONAL RADIOLOGY/VASCULAR | Age: 67
DRG: 982 | End: 2022-02-15
Payer: COMMERCIAL

## 2022-02-15 PROBLEM — Z79.01 CHRONIC ANTICOAGULATION: Status: RESOLVED | Noted: 2021-12-13 | Resolved: 2022-02-15

## 2022-02-15 LAB
ALBUMIN SERPL-MCNC: 2.6 G/DL (ref 3.4–5)
ALBUMIN/GLOB SERPL: 1.1 {RATIO} (ref 0.8–1.7)
ALP SERPL-CCNC: 143 U/L (ref 45–117)
ALT SERPL-CCNC: 44 U/L (ref 16–61)
AMMONIA PLAS-SCNC: 71 UMOL/L (ref 11–32)
AMMONIA PLAS-SCNC: 75 UMOL/L (ref 11–32)
ANION GAP SERPL CALC-SCNC: 8 MMOL/L (ref 3–18)
ANION GAP SERPL CALC-SCNC: 9 MMOL/L (ref 3–18)
APPEARANCE FLD: ABNORMAL
AST SERPL-CCNC: 74 U/L (ref 10–38)
BASOPHILS # BLD: 0.1 K/UL (ref 0–0.1)
BASOPHILS NFR BLD: 1 % (ref 0–2)
BILIRUB SERPL-MCNC: 3.4 MG/DL (ref 0.2–1)
BUN SERPL-MCNC: 27 MG/DL (ref 7–18)
BUN SERPL-MCNC: 32 MG/DL (ref 7–18)
BUN/CREAT SERPL: 26 (ref 12–20)
BUN/CREAT SERPL: 28 (ref 12–20)
CALCIUM SERPL-MCNC: 8.1 MG/DL (ref 8.5–10.1)
CALCIUM SERPL-MCNC: 8.6 MG/DL (ref 8.5–10.1)
CHLORIDE SERPL-SCNC: 94 MMOL/L (ref 100–111)
CHLORIDE SERPL-SCNC: 98 MMOL/L (ref 100–111)
CO2 SERPL-SCNC: 22 MMOL/L (ref 21–32)
CO2 SERPL-SCNC: 23 MMOL/L (ref 21–32)
COLOR FLD: YELLOW
CREAT SERPL-MCNC: 0.96 MG/DL (ref 0.6–1.3)
CREAT SERPL-MCNC: 1.23 MG/DL (ref 0.6–1.3)
DIFFERENTIAL METHOD BLD: ABNORMAL
EOSINOPHIL # BLD: 0.3 K/UL (ref 0–0.4)
EOSINOPHIL NFR BLD: 5 % (ref 0–5)
EOSINOPHIL NFR FLD MANUAL: 0 %
ERYTHROCYTE [DISTWIDTH] IN BLOOD BY AUTOMATED COUNT: 14.4 % (ref 11.6–14.5)
ERYTHROCYTE [DISTWIDTH] IN BLOOD BY AUTOMATED COUNT: 14.6 % (ref 11.6–14.5)
GLOBULIN SER CALC-MCNC: 2.3 G/DL (ref 2–4)
GLUCOSE SERPL-MCNC: 110 MG/DL (ref 74–99)
GLUCOSE SERPL-MCNC: 144 MG/DL (ref 74–99)
HCT VFR BLD AUTO: 24.5 % (ref 36–48)
HCT VFR BLD AUTO: 25.1 % (ref 36–48)
HGB BLD-MCNC: 8.8 G/DL (ref 13–16)
HGB BLD-MCNC: 9 G/DL (ref 13–16)
IMM GRANULOCYTES # BLD AUTO: 0.1 K/UL (ref 0–0.04)
IMM GRANULOCYTES NFR BLD AUTO: 1 % (ref 0–0.5)
INR PPP: 1.6 (ref 0.8–1.2)
INR PPP: 1.7 (ref 0.8–1.2)
LYMPHOCYTES # BLD: 0.7 K/UL (ref 0.9–3.6)
LYMPHOCYTES NFR BLD: 11 % (ref 21–52)
LYMPHOCYTES NFR FLD: 14 %
MACROPHAGES NFR FLD: 78 %
MCH RBC QN AUTO: 37.4 PG (ref 24–34)
MCH RBC QN AUTO: 37.8 PG (ref 24–34)
MCHC RBC AUTO-ENTMCNC: 35.9 G/DL (ref 31–37)
MCHC RBC AUTO-ENTMCNC: 35.9 G/DL (ref 31–37)
MCV RBC AUTO: 104.3 FL (ref 78–100)
MCV RBC AUTO: 105.5 FL (ref 78–100)
MONOCYTES # BLD: 0.9 K/UL (ref 0.05–1.2)
MONOCYTES NFR BLD: 14 % (ref 3–10)
MONOCYTES NFR FLD: 0 %
NEUTROPHILS NFR FLD: 8 %
NEUTS BAND # FLD: 0 %
NEUTS SEG # BLD: 4.3 K/UL (ref 1.8–8)
NEUTS SEG NFR BLD: 68 % (ref 40–73)
NRBC # BLD: 0 K/UL (ref 0–0.01)
NRBC # BLD: 0 K/UL (ref 0–0.01)
NRBC BLD-RTO: 0 PER 100 WBC
NRBC BLD-RTO: 0 PER 100 WBC
NUC CELL # FLD: 587 /CU MM
PLATELET # BLD AUTO: 70 K/UL (ref 135–420)
PLATELET # BLD AUTO: 73 K/UL (ref 135–420)
PMV BLD AUTO: 9.3 FL (ref 9.2–11.8)
PMV BLD AUTO: 9.4 FL (ref 9.2–11.8)
POTASSIUM SERPL-SCNC: 4.8 MMOL/L (ref 3.5–5.5)
POTASSIUM SERPL-SCNC: 5.1 MMOL/L (ref 3.5–5.5)
PROT SERPL-MCNC: 4.9 G/DL (ref 6.4–8.2)
PROTHROMBIN TIME: 18.1 SEC (ref 11.5–15.2)
PROTHROMBIN TIME: 19.3 SEC (ref 11.5–15.2)
RBC # BLD AUTO: 2.35 M/UL (ref 4.35–5.65)
RBC # BLD AUTO: 2.38 M/UL (ref 4.35–5.65)
RBC # FLD: 1300 /CU MM
SODIUM SERPL-SCNC: 126 MMOL/L (ref 136–145)
SODIUM SERPL-SCNC: 128 MMOL/L (ref 136–145)
SPECIMEN SOURCE FLD: ABNORMAL
TSH SERPL DL<=0.05 MIU/L-ACNC: 1.94 UIU/ML (ref 0.36–3.74)
WBC # BLD AUTO: 6.4 K/UL (ref 4.6–13.2)
WBC # BLD AUTO: 6.4 K/UL (ref 4.6–13.2)

## 2022-02-15 PROCEDURE — 77030013687 IR INSERT TIPS HEPATIC SHUNT

## 2022-02-15 PROCEDURE — 85027 COMPLETE CBC AUTOMATED: CPT

## 2022-02-15 PROCEDURE — 0W9G3ZZ DRAINAGE OF PERITONEAL CAVITY, PERCUTANEOUS APPROACH: ICD-10-PCS | Performed by: PHYSICIAN ASSISTANT

## 2022-02-15 PROCEDURE — 74011250636 HC RX REV CODE- 250/636: Performed by: NURSE ANESTHETIST, CERTIFIED REGISTERED

## 2022-02-15 PROCEDURE — 74011000250 HC RX REV CODE- 250: Performed by: NURSE ANESTHETIST, CERTIFIED REGISTERED

## 2022-02-15 PROCEDURE — 74011250637 HC RX REV CODE- 250/637: Performed by: HOSPITALIST

## 2022-02-15 PROCEDURE — 82140 ASSAY OF AMMONIA: CPT

## 2022-02-15 PROCEDURE — P9047 ALBUMIN (HUMAN), 25%, 50ML: HCPCS | Performed by: HOSPITALIST

## 2022-02-15 PROCEDURE — 80053 COMPREHEN METABOLIC PANEL: CPT

## 2022-02-15 PROCEDURE — 77010033678 HC OXYGEN DAILY

## 2022-02-15 PROCEDURE — 77030008477 HC STYL SATN SLP COVD -A: Performed by: ANESTHESIOLOGY

## 2022-02-15 PROCEDURE — 74011250636 HC RX REV CODE- 250/636: Performed by: HOSPITALIST

## 2022-02-15 PROCEDURE — 84443 ASSAY THYROID STIM HORMONE: CPT

## 2022-02-15 PROCEDURE — 77030008683 HC TU ET CUF COVD -A: Performed by: ANESTHESIOLOGY

## 2022-02-15 PROCEDURE — 85610 PROTHROMBIN TIME: CPT

## 2022-02-15 PROCEDURE — 97162 PT EVAL MOD COMPLEX 30 MIN: CPT

## 2022-02-15 PROCEDURE — 85025 COMPLETE CBC W/AUTO DIFF WBC: CPT

## 2022-02-15 PROCEDURE — 77030006643: Performed by: ANESTHESIOLOGY

## 2022-02-15 PROCEDURE — 06183J4 BYPASS PORTAL VEIN TO HEPATIC VEIN WITH SYNTHETIC SUBSTITUTE, PERCUTANEOUS APPROACH: ICD-10-PCS | Performed by: RADIOLOGY

## 2022-02-15 PROCEDURE — 89051 BODY FLUID CELL COUNT: CPT

## 2022-02-15 PROCEDURE — 77030013079 HC BLNKT BAIR HGGR 3M -A: Performed by: ANESTHESIOLOGY

## 2022-02-15 PROCEDURE — 74011000636 HC RX REV CODE- 636: Performed by: RADIOLOGY

## 2022-02-15 PROCEDURE — 77030041504 US GUIDE PARACENTESIS

## 2022-02-15 PROCEDURE — 36415 COLL VENOUS BLD VENIPUNCTURE: CPT

## 2022-02-15 PROCEDURE — 65610000006 HC RM INTENSIVE CARE

## 2022-02-15 PROCEDURE — 74011250636 HC RX REV CODE- 250/636: Performed by: RADIOLOGY

## 2022-02-15 PROCEDURE — 74011000250 HC RX REV CODE- 250: Performed by: HOSPITALIST

## 2022-02-15 RX ORDER — LIDOCAINE HYDROCHLORIDE 20 MG/ML
INJECTION, SOLUTION EPIDURAL; INFILTRATION; INTRACAUDAL; PERINEURAL AS NEEDED
Status: DISCONTINUED | OUTPATIENT
Start: 2022-02-15 | End: 2022-02-15 | Stop reason: HOSPADM

## 2022-02-15 RX ORDER — EPHEDRINE SULFATE/0.9% NACL/PF 50 MG/5 ML
SYRINGE (ML) INTRAVENOUS AS NEEDED
Status: DISCONTINUED | OUTPATIENT
Start: 2022-02-15 | End: 2022-02-15 | Stop reason: HOSPADM

## 2022-02-15 RX ORDER — CEFAZOLIN SODIUM/WATER 2 G/20 ML
2 SYRINGE (ML) INTRAVENOUS
Status: COMPLETED | OUTPATIENT
Start: 2022-02-15 | End: 2022-02-15

## 2022-02-15 RX ORDER — LIDOCAINE HYDROCHLORIDE 10 MG/ML
10 INJECTION, SOLUTION EPIDURAL; INFILTRATION; INTRACAUDAL; PERINEURAL
Status: COMPLETED | OUTPATIENT
Start: 2022-02-15 | End: 2022-02-15

## 2022-02-15 RX ORDER — MIDAZOLAM HYDROCHLORIDE 1 MG/ML
INJECTION, SOLUTION INTRAMUSCULAR; INTRAVENOUS AS NEEDED
Status: DISCONTINUED | OUTPATIENT
Start: 2022-02-15 | End: 2022-02-15 | Stop reason: HOSPADM

## 2022-02-15 RX ORDER — HEPARIN SODIUM 200 [USP'U]/100ML
500 INJECTION, SOLUTION INTRAVENOUS
Status: COMPLETED | OUTPATIENT
Start: 2022-02-15 | End: 2022-02-15

## 2022-02-15 RX ORDER — LIDOCAINE HYDROCHLORIDE 10 MG/ML
1-10 INJECTION, SOLUTION EPIDURAL; INFILTRATION; INTRACAUDAL; PERINEURAL
Status: COMPLETED | OUTPATIENT
Start: 2022-02-15 | End: 2022-02-15

## 2022-02-15 RX ORDER — FENTANYL CITRATE 50 UG/ML
INJECTION, SOLUTION INTRAMUSCULAR; INTRAVENOUS AS NEEDED
Status: DISCONTINUED | OUTPATIENT
Start: 2022-02-15 | End: 2022-02-15 | Stop reason: HOSPADM

## 2022-02-15 RX ORDER — SUCCINYLCHOLINE CHLORIDE 100 MG/5ML
SYRINGE (ML) INTRAVENOUS AS NEEDED
Status: DISCONTINUED | OUTPATIENT
Start: 2022-02-15 | End: 2022-02-15 | Stop reason: HOSPADM

## 2022-02-15 RX ORDER — ROCURONIUM BROMIDE 10 MG/ML
INJECTION, SOLUTION INTRAVENOUS AS NEEDED
Status: DISCONTINUED | OUTPATIENT
Start: 2022-02-15 | End: 2022-02-15 | Stop reason: HOSPADM

## 2022-02-15 RX ORDER — ONDANSETRON 2 MG/ML
INJECTION INTRAMUSCULAR; INTRAVENOUS AS NEEDED
Status: DISCONTINUED | OUTPATIENT
Start: 2022-02-15 | End: 2022-02-15 | Stop reason: HOSPADM

## 2022-02-15 RX ORDER — CEFAZOLIN SODIUM/WATER 2 G/20 ML
SYRINGE (ML) INTRAVENOUS
Status: COMPLETED
Start: 2022-02-15 | End: 2022-02-15

## 2022-02-15 RX ORDER — DEXAMETHASONE SODIUM PHOSPHATE 4 MG/ML
INJECTION, SOLUTION INTRA-ARTICULAR; INTRALESIONAL; INTRAMUSCULAR; INTRAVENOUS; SOFT TISSUE AS NEEDED
Status: DISCONTINUED | OUTPATIENT
Start: 2022-02-15 | End: 2022-02-15 | Stop reason: HOSPADM

## 2022-02-15 RX ORDER — PROPOFOL 10 MG/ML
INJECTION, EMULSION INTRAVENOUS AS NEEDED
Status: DISCONTINUED | OUTPATIENT
Start: 2022-02-15 | End: 2022-02-15 | Stop reason: HOSPADM

## 2022-02-15 RX ORDER — MIDAZOLAM HYDROCHLORIDE 1 MG/ML
INJECTION, SOLUTION INTRAMUSCULAR; INTRAVENOUS
Status: COMPLETED
Start: 2022-02-15 | End: 2022-02-15

## 2022-02-15 RX ORDER — FENTANYL CITRATE 50 UG/ML
INJECTION, SOLUTION INTRAMUSCULAR; INTRAVENOUS
Status: COMPLETED
Start: 2022-02-15 | End: 2022-02-15

## 2022-02-15 RX ADMIN — Medication 5 MG: at 12:45

## 2022-02-15 RX ADMIN — HEPARIN SODIUM IN SODIUM CHLORIDE 1000 UNITS: 200 INJECTION INTRAVENOUS at 13:36

## 2022-02-15 RX ADMIN — LIDOCAINE HYDROCHLORIDE 80 MG: 20 INJECTION, SOLUTION EPIDURAL; INFILTRATION; INTRACAUDAL; PERINEURAL at 12:19

## 2022-02-15 RX ADMIN — ALBUMIN (HUMAN) 25 G: 0.25 INJECTION, SOLUTION INTRAVENOUS at 15:04

## 2022-02-15 RX ADMIN — PANTOPRAZOLE SODIUM 40 MG: 40 TABLET, DELAYED RELEASE ORAL at 09:10

## 2022-02-15 RX ADMIN — FENTANYL CITRATE 100 MCG: 50 INJECTION, SOLUTION INTRAMUSCULAR; INTRAVENOUS at 12:15

## 2022-02-15 RX ADMIN — ROCURONIUM BROMIDE 10 MG: 10 INJECTION, SOLUTION INTRAVENOUS at 12:20

## 2022-02-15 RX ADMIN — PROPOFOL 40 MG: 10 INJECTION, EMULSION INTRAVENOUS at 12:22

## 2022-02-15 RX ADMIN — Medication 2 G: at 12:36

## 2022-02-15 RX ADMIN — LACTULOSE 15 ML: 20 SOLUTION ORAL at 21:45

## 2022-02-15 RX ADMIN — Medication 120 MG: at 12:20

## 2022-02-15 RX ADMIN — MIDAZOLAM 2 MG: 1 INJECTION INTRAMUSCULAR; INTRAVENOUS at 12:07

## 2022-02-15 RX ADMIN — LACTULOSE 15 ML: 20 SOLUTION ORAL at 09:11

## 2022-02-15 RX ADMIN — FLECAINIDE ACETATE 50 MG: 100 TABLET ORAL at 09:10

## 2022-02-15 RX ADMIN — Medication 10 MG: at 12:47

## 2022-02-15 RX ADMIN — SODIUM CHLORIDE, PRESERVATIVE FREE 10 ML: 5 INJECTION INTRAVENOUS at 14:00

## 2022-02-15 RX ADMIN — PROPOFOL 160 MG: 10 INJECTION, EMULSION INTRAVENOUS at 12:20

## 2022-02-15 RX ADMIN — SODIUM CHLORIDE: 900 INJECTION, SOLUTION INTRAVENOUS at 13:03

## 2022-02-15 RX ADMIN — DEXAMETHASONE SODIUM PHOSPHATE 4 MG: 4 INJECTION, SOLUTION INTRAMUSCULAR; INTRAVENOUS at 12:50

## 2022-02-15 RX ADMIN — IOPAMIDOL 145 ML: 612 INJECTION, SOLUTION INTRAVENOUS at 13:34

## 2022-02-15 RX ADMIN — LIDOCAINE HYDROCHLORIDE 5 ML: 10 INJECTION, SOLUTION EPIDURAL; INFILTRATION; INTRACAUDAL; PERINEURAL at 13:35

## 2022-02-15 RX ADMIN — SODIUM CHLORIDE, PRESERVATIVE FREE 10 ML: 5 INJECTION INTRAVENOUS at 06:02

## 2022-02-15 RX ADMIN — SODIUM CHLORIDE, PRESERVATIVE FREE 10 ML: 5 INJECTION INTRAVENOUS at 21:45

## 2022-02-15 RX ADMIN — SODIUM CHLORIDE 50 ML/HR: 900 INJECTION, SOLUTION INTRAVENOUS at 09:13

## 2022-02-15 RX ADMIN — ONDANSETRON HYDROCHLORIDE 4 MG: 2 INJECTION INTRAMUSCULAR; INTRAVENOUS at 13:33

## 2022-02-15 RX ADMIN — FLECAINIDE ACETATE 50 MG: 100 TABLET ORAL at 21:45

## 2022-02-15 RX ADMIN — ALBUMIN (HUMAN) 25 G: 0.25 INJECTION, SOLUTION INTRAVENOUS at 06:01

## 2022-02-15 RX ADMIN — LIDOCAINE HYDROCHLORIDE 10 ML: 10 INJECTION, SOLUTION EPIDURAL; INFILTRATION; INTRACAUDAL; PERINEURAL at 08:00

## 2022-02-15 NOTE — PROGRESS NOTES
Problem: Mobility Impaired (Adult and Pediatric)  Goal: *Acute Goals and Plan of Care (Insert Text)  Description: Physical Therapy Goals   Initiated 2/15/2022 and to be accomplished within 2 day(s)  1. Patient will move from supine <> sit with S in prep for out of bed activity and change of position. 2.  Patient will perform sit<> stand with S with LRAD in prep for transfers/ambulation. 3.  Patient will ambulate 150 feet with S/LRAD for improved functional mobility at discharge. 4.  Patient will ascend/descend 3-5 stairs with handrail(s) with S/SBA for home navigation as needed. Outcome: Progressing Towards Goal  PHYSICAL THERAPY EVALUATION    Patient: Roopa Greenberg (54 y.o. male)  Date: 2/15/2022  Primary Diagnosis: Hyponatremia [E87.1]  Hyperkalemia [E87.5]  Precautions: fall  PLOF: amb independently w/o AD; lives with spouse in 3 story home with 4 MICHELLE no HR's    ASSESSMENT :  Based on the objective data described below, the patient is seen on telemetry unit following admission for above dx following lab work. Pt found standing in bathroom on PT arrival.  Amb back to bed while pushing IV pole. Pt presents with decreased functional strength LE's and decr'd independence with ambulation. Pt demonstrates transfers STS with mod I, and participated with GT while pushing IV pole ~100ft. Gena decr'd and pt experienced 2 episodes of slight path deviation w/o LOB. Pt reports no falls in last 3-6 mths. Pt returned to room and left with spouse and Dr. Lane Lawton present. Pt for TIPS procedure today. Patient reports no pain this session. Will continue IPPT for goals above. Recommendation for discharge TBD post procedure. Patient will benefit from skilled intervention to address the above impairments.     Pt Education: Role of physical therapy in acute care setting, fall prevention and safety/technique during functional mobility tasks    Patient's rehabilitation potential is considered to be Good  Factors which may influence rehabilitation potential include:   []         None noted  []         Mental ability/status  [x]         Medical condition  []         Home/family situation and support systems  []         Safety awareness  []         Pain tolerance/management  []         Other:      PLAN :  Recommendations and Planned Interventions:   [x]           Bed Mobility Training             []    Neuromuscular Re-Education  [x]           Transfer Training                   []    Orthotic/Prosthetic Training  [x]           Gait Training                          []    Modalities  [x]           Therapeutic Exercises           []    Edema Management/Control  [x]           Therapeutic Activities            []    Family Training/Education  [x]           Patient Education  []           Other (comment):    Frequency/Duration: Patient will be followed by physical therapy 1-2 times per day/4-7 days per week to address goals. Discharge Recommendations: To Be Determined  Further Equipment Recommendations for Discharge: N/A     SUBJECTIVE:   Patient stated I feel okay.     OBJECTIVE DATA SUMMARY:     Past Medical History:   Diagnosis Date    Acid reflux    No past surgical history on file. Barriers to Learning/Limitations: None  Compensate with: N/A  Home Situation:  Home Situation  Home Environment: Private residence  # Steps to Enter: 4  Rails to Enter: No  Wheelchair Ramp: No  One/Two Story Residence: Two story (3 story)  # of Interior Steps: 15  Interior Rails: Right  Lift Chair Available: No  Living Alone: No  Support Systems: Spouse/Significant Other  Patient Expects to be Discharged to[de-identified] Home  Current DME Used/Available at Home: None (indept w/o AD PTA)  Tub or Shower Type: Shower  Critical Behavior:  Neurologic State: Alert; Appropriate for age  Orientation Level: Oriented X4  Cognition: Follows commands  Safety/Judgement: Awareness of environment; Fall prevention  Psychosocial  Patient Behaviors: Calm; Cooperative  Family Behaviors: Calm;Supportive  Family  Behaviors: Calm;Supportive  Strength:    Strength: Generally decreased, functional  Tone & Sensation:   Sensation: Intact  Range Of Motion:  AROM: Within functional limits  Functional Mobility:  Transfers:  Sit to Stand: Modified independent  Stand to Sit: Modified independent  Balance:   Sitting: Intact  Standing: Intact; With support (pusing IV pole)  Ambulation/Gait Training:  Distance (ft): 100 Feet (ft)  Assistive Device: Walker, rolling  Ambulation - Level of Assistance: Supervision  Gait Description (WDL): Exceptions to WDL  Gait Abnormalities: Decreased step clearance; Path deviations (path deviations x 2 w/o LOB)  Speed/Gena: Pace decreased (<100 feet/min)  Pain:  Pain level pre-treatment: 0/10   Pain level post-treatment: 0/10   Pain Intervention(s) : Medication (see MAR); Rest, Ice, Repositioning  Response to intervention: Nurse notified, See doc flow  Activity Tolerance:   Fair   Please refer to the flowsheet for vital signs taken during this treatment. After treatment:   [x]         Patient left in no apparent distress sitting up EOB  []         Patient left in no apparent distress in bed  [x]         Call bell left within reach  []         Nursing notified  [x]         Caregiver present and Dr. Lane Lawton  []         Bed alarm activated  []         SCDs applied    COMMUNICATION/EDUCATION:   [x]         Role of Physical Therapy in the acute care setting. [x]         Fall prevention education was provided and the patient/caregiver indicated understanding. [x]         Patient/family have participated as able in goal setting and plan of care. [x]         Patient/family agree to work toward stated goals and plan of care. []         Patient understands intent and goals of therapy, but is neutral about his/her participation. []         Patient is unable to participate in goal setting/plan of care: ongoing with therapy staff.  []         Other:     Thank you for this referral.  Taylor Rodgers, PT   Time Calculation: 20 mins      Eval Complexity: History: HIGH Complexity :3+ comorbidities / personal factors will impact the outcome/ POC Exam:LOW Complexity : 1-2 Standardized tests and measures addressing body structure, function, activity limitation and / or participation in recreation  Presentation: MEDIUM Complexity : Evolving with changing characteristics  Clinical Decision Making:Medium Complexity    Overall Complexity:MEDIUM

## 2022-02-15 NOTE — PROGRESS NOTES
Transition of Care (RAHE[facility]) Plan:          Pt admitted for a TIPS surgical procedure. Pt is independent. Please encourage ambulation as appropriate. CM available to assist as needed. [facility] Transportation:   How is patient being transported at discharge? Family/Friend      When? Once cleared by physician     Is transport scheduled? N/A      Follow-up appointment and transportation:   PCP/Specialist?  See AVS for Appointment         Who is transporting to the follow-up appointment? Self/Family/Friend      Is transport for follow up appointment scheduled? N/A    Communication plan (with patient/family): Who is being called? Patient or Next of Kin? Responsible party? Patient      What number(s) is to be used? See Facesheet      What service provider is calling for [facility] services? When are they calling? Readmission Risk? (Green/Low; Yellow/Moderate; Red/High):  [employer] here to complete [address] including selection of the Healthcare Decision Maker Relationship (ie \"Primary\")    CM spoke with patient at bedside, patient confirmed primary contact and contact info and denies that he uses DME at home. Patient gave CM permission to call his spouse to complete assessment as he was still sleepy from his medication Peter Gillette is in the lobby. \"    CM attempted to call spouse, left vm.      Care Management Interventions  Transition of Care Consult (CM Consult): Discharge Planning  Support Systems: Spouse/Significant Other  Confirm Follow Up Transport: Family  Discharge Location  Patient Expects to be Discharged to[de-identified]  (discharge home with phsician follow up and family assistance)

## 2022-02-15 NOTE — CONSULTS
[facility]  Pulmonary, Critical Care, and Sleep Medicine    Name: Karen Huff MRN: [id number]   : 1955 Hospital: [facility] [place]    Date: 2/15/2022  Room: [address] [place] [address] Note                                              Consult requesting physician: Dr. Wilmer Pena  Reason for Consult: post-TIPS ICU care    IMPRESSION:     · Portal HTN, s/p TIPS - K76.6  ·   Active Hospital Problems    Diagnosis Date Noted    Hyperkalemia 2022    Hyponatremia 2022    Acid reflux     Alcohol abuse, in remission 2021    Ascites 2021    Cirrhosis ([address] 2021    Esophageal varices ([address] 2021    Paroxysmal atrial fibrillation ([address] 3795    Alcoholic liver disease ([address] 2021   ·    Patient Active Problem List   Diagnosis Code    Alcoholic liver disease ([address] K70.9    Cirrhosis ([address] K74.60    Ascites R18.8    Esophageal varices (HCC) I85.00    Psoriasis L40.9    Paroxysmal atrial fibrillation ([address] I48.0    Alcohol abuse, in remission F10.11    Hyponatremia E87.1    Acid reflux K21.9    Hyperkalemia E87.5   Code status: Full Code     RECOMMENDATIONS:     Respiratory: Compensated respiratory status, on O2 via nasal cannula at 2 L/min  No acute issues. Protecting airway  Chest x-ray ordered for a.m. Monitor for any signs or symptoms suggestive of noncardiogenic pulmonary edema post TIPS  Keep SPO2 >=92%. HOB 30 degree elevation all the time. Aggressive pulmonary toileting. Aspiration precautions. Incentive spirometry.     CVS: Hemodynamically stable    ID: No clinical evidence for sepsis    Hematology/Oncology: No evidence of active bleeding anywhere  Monitor CBC for hemoglobin and platelet  Monitor INR per hepatology    Renal: Await nephrology input  Correct S. Na+ as needed per nephrology  Chronic hyponatremia likely related to chronic liver disease    GI/: Diet as tolerated  LFTs are expected to increase post TIPS  Continue lactulose 3 times a day  Monitor for any worsening mentation related to hepatic encephalopathy from increasing ammonia post TIPS  PPI for GI prophylaxis    Endocrine: Monitor FSBS as needed for any hypoglycemia    Neurology: Monitor mentation for any development of hepatic encephalopathy post TIPS  AAO x 4. Nonfocal exam    Toxicology: Reports quit EtOH more than 1 year ago    Pain/Sedation: Avoid any sedative, opiate, hypnotic    Skin/Wound: As per nursing care    Electrolytes: Replace electrolytes per ICU electrolyte replacement protocol. IVF: NS at 50 mL an hour    Nutrition: Diet as tolerated    Prophylaxis: DVT Prophylaxis: SCD. GI Prophylaxis: PPI. Restraints: none    Lines/Tubes: PIV, RT IJ sheet  Pt does not have Persaud    Advance Directive/Palliative Care: Consult per clinical course. Will defer respective systems problem management to primary and other respective consultant and follow patient in ICU with primary and other medical team.  Further recommendations will be based on the patient's response to recommended treatment and results of the investigation ordered. Quality Care: PPI, DVT prophylaxis, HOB elevated, Infection control all reviewed and addressed. Care of plan d/w RN, RT, hospitalist Dr. Richard Teran  D/w patient (answered all questions to satisfaction). High complexity decision making was performed during the evaluation of this patient at high risk for decompensation with multiple organ involvement. Total critical care time spent rendering care exclusive of procedures/family discussion/coordination of care: 55 minutes. Subjective/History of Present Illness:     Patient is a 61 y.o. male with PMHx significant for alcoholic cirrhosis, esophageal varices, portal hypertension, A. fib, chronic hyponatremia, GERD who presented to [facility] ER for abnormal labs.   Patient reports being scheduled to get US guided paracentesis and underwent preprocedure labs which showed elevated K+ to 6.3 when he was advised to come to the ER for further treatment and work-up. In ER, patient also noted to have TOMA, elevated ammonia. Hepatology and nephrology were consulted and patient was admitted. During his hospitalization, hyperkalemia and TOMA resolved. Patient received paracentesis and TIPS procedure today as recommended by hepatology and postprocedure patient was transferred to ICU for close observation and care when this consult was requested. The patient denies fever, chills, cough, chest pain, dyspnea, wheezing, hemoptysis, palpitations, syncope, orthopnea, paroxysmal nocturnal dyspnea. Has chronic leg edema BL. The patient denies abdominal or flank pain, anorexia, nausea or vomiting, dysphagia, change in bowel habits or black or bloody stools or weight loss. Pt seen at bedside in ICU rm # [phone]           I/O last 24 hrs: Intake/Output Summary (Last 24 hours) at 2/15/2022 1503  Last data filed at 2/15/2022 1341  Gross per 24 hour   Intake 1700 ml   Output 150 ml   Net 1550 ml       History taken from patient, EMR    Review of Systems:  A comprehensive review of systems was negative except for that written in the HPI. No Known Allergies     Past Medical History:   Diagnosis Date    Acid reflux       No past surgical history on file. Social History     Tobacco Use    Smoking status: Never Smoker    Smokeless tobacco: Never Used   Substance Use Topics    Alcohol use: Not Currently      No family history on file. Prior to Admission medications    Medication Sig Start Date End Date Taking? Authorizing Provider   furosemide (LASIX) 40 mg tablet Take 1 Tablet by mouth daily. 2/2/22   Javon Ramesh MD   spironolactone (Aldactone) 100 mg tablet Take 1 Tablet by mouth daily. 2/2/22   Javon Ramesh MD   Eliquis 5 mg tablet  12/12/21   Provider, Historical   flecainide (TAMBOCOR) 100 mg tablet Take 50 mg by mouth every twelve (12) hours.     Provider, Historical   pantoprazole (PROTONIX) 20 mg tablet Take 20 mg by mouth daily. Provider, Historical   sildenafil citrate (VIAGRA) 100 mg tablet take 1 tablet by mouth 1 hour prior to intercourse 21   Provider, Historical   ustekinaumab 90 mg/mL injection  21   Provider, Historical     Current Facility-Administered Medications   Medication Dose Route Frequency    lactulose (CHRONULAC) 10 gram/15 mL solution 15 mL  10 g Oral TID    iopamidoL (ISOVUE 300) 61 % contrast injection 1-100 mL  1-100 mL IntraCATHeter RAD CONTINUOUS    albumin human 25% (BUMINATE) solution 25 g  25 g IntraVENous Q6H    sodium chloride (NS) flush 5-40 mL  5-40 mL IntraVENous Q8H    pantoprazole (PROTONIX) tablet 40 mg  40 mg Oral DAILY    flecainide (TAMBOCOR) tablet 50 mg  50 mg Oral Q12H    0.9% sodium chloride infusion  50 mL/hr IntraVENous CONTINUOUS         Objective:   Vital Signs:    Visit Vitals  BP (!) 120/55   Pulse 79   Temp 98.1 °F (36.7 °C)   Resp 17   Ht 6' 2\" (1.88 m)   Wt 100.7 kg (222 lb 0.1 oz)   SpO2 100%   BMI 28.50 kg/m²       O2 Device: Nasal cannula       Temp (24hrs), Av °F (36.7 °C), Min:97.3 °F (36.3 °C), Max:98.5 °F (36.9 °C)       Intake/Output:   Last shift:      02/15 07 - 02/15 1900  In: 6448 [I.V.:1220]  Out: -     Last 3 shifts:  190 - 02/15 0700  In: 480 [P.O.:480]  Out: 150 [Urine:150]      Intake/Output Summary (Last 24 hours) at 2/15/2022 1503  Last data filed at 2/15/2022 1341  Gross per 24 hour   Intake 1700 ml   Output 150 ml   Net 1550 ml       Last 3 Recorded Weights in this Encounter    22 1611 22 1935   Weight: 100.7 kg (222 lb 0.1 oz) 100.7 kg (222 lb 0.1 oz)       No results for input(s): PHI, PHI, POC2, PCO2I, PO2, PO2I, HCO3, HCO3I, FIO2, FIO2I in the last 72 hours. Physical Exam:     General/Neurology: Alert, Awake, NAD, O x 4. On O2 via NC  Head:   Normocephalic, without obvious abnormality, atraumatic. Eye:   EOM intact. Scleral icterus, no cyanosis.   Nose:   No sinus tenderness  Throat:  Lips, mucosa, and tongue normal. No oral thrush. Neck:   Supple, symmetric. No lymphadenopathy. Trachea midline  Lung: Moderate air entry bilateral equal. No rales. No rhonchi. No wheezing. No stridors. No prolongded expiration. No accessory muscle use. Heart:   Regular rate & rhythm. S1 S2 present. No murmur. No JVD. Abdomen:  Soft. NT. Protuberant. +BS. No masses. Extremities:  1-2+ BL pedal edema. No cyanosis. No clubbing. Pulses: 2+ and symmetric in DP. Capillary refill: normal  Lymphatic:  No cervical or supraclavicular palpable lymphadenopathy. Musculoskeletal: No joint swelling. No tenderness.   Skin:   Color, texture, turgor fair      Data:       Recent Results (from the past 24 hour(s))   EKG, 12 LEAD, INITIAL    Collection Time: 02/14/22  4:10 PM   Result Value Ref Range    Ventricular Rate 87 BPM    Atrial Rate 87 BPM    P-R Interval 166 ms    QRS Duration 100 ms    Q-T Interval 392 ms    QTC Calculation (Bezet) 471 ms    Calculated P Axis 53 degrees    Calculated R Axis -35 degrees    Calculated T Axis 18 degrees    Diagnosis       Normal sinus rhythm  Left axis deviation  Abnormal ECG  When compared with ECG of 04-FEB-2022 12:55,  No significant change was found     TROPONIN-HIGH SENSITIVITY    Collection Time: 02/14/22  4:15 PM   Result Value Ref Range    Troponin-High Sensitivity 18 0 - 78 ng/L   AMMONIA    Collection Time: 02/14/22  4:15 PM   Result Value Ref Range    Ammonia 60 (H) 11 - 32 UMOL/L   CBC WITH AUTOMATED DIFF    Collection Time: 02/14/22  4:29 PM   Result Value Ref Range    WBC 8.4 4.6 - 13.2 K/uL    RBC 3.07 (L) 4.35 - 5.65 M/uL    HGB 11.3 (L) 13.0 - 16.0 g/dL    HCT 31.5 (L) 36.0 - 48.0 %    .6 (H) 78.0 - 100.0 FL    MCH 36.8 (H) 24.0 - 34.0 PG    MCHC 35.9 31.0 - 37.0 g/dL    RDW 14.6 (H) 11.6 - 14.5 %    PLATELET 875 (L) 745 - 420 K/uL    MPV 9.7 9.2 - 11.8 FL    NRBC 0.0 0  WBC    ABSOLUTE NRBC 0.00 0.00 - 0.01 K/uL    NEUTROPHILS 69 40 - 73 %    LYMPHOCYTES 11 (L) 21 - 52 %    MONOCYTES 12 (H) 3 - 10 %    EOSINOPHILS 5 0 - 5 %    BASOPHILS 1 0 - 2 %    IMMATURE GRANULOCYTES 2 (H) 0.0 - 0.5 %    ABS. NEUTROPHILS 5.8 1.8 - 8.0 K/UL    ABS. LYMPHOCYTES 1.0 0.9 - 3.6 K/UL    ABS. MONOCYTES 1.0 0.05 - 1.2 K/UL    ABS. EOSINOPHILS 0.4 0.0 - 0.4 K/UL    ABS. BASOPHILS 0.1 0.0 - 0.1 K/UL    ABS. IMM. GRANS. 0.2 (H) 0.00 - 0.04 K/UL    DF AUTOMATED     CK-MB,QT    Collection Time: 02/14/22  5:27 PM   Result Value Ref Range    CK - MB 4.2 (H) <3.6 ng/ml    CK-MB Index 4.8 (H) 0.0 - 4.0 %   CK    Collection Time: 02/14/22  5:27 PM   Result Value Ref Range    CK 87 39 - 308 U/L   MAGNESIUM    Collection Time: 02/14/22  5:27 PM   Result Value Ref Range    Magnesium 2.1 1.6 - 2.6 mg/dL   METABOLIC PANEL, COMPREHENSIVE    Collection Time: 02/14/22  5:27 PM   Result Value Ref Range    Sodium 124 (L) 136 - 145 mmol/L    Potassium 5.8 (H) 3.5 - 5.5 mmol/L    Chloride 94 (L) 100 - 111 mmol/L    CO2 22 21 - 32 mmol/L    Anion gap 8 3.0 - 18 mmol/L    Glucose 204 (H) 74 - 99 mg/dL    BUN 32 (H) 7.0 - 18 MG/DL    Creatinine 1.45 (H) 0.6 - 1.3 MG/DL    BUN/Creatinine ratio 22 (H) 12 - 20      GFR est AA 59 (L) >60 ml/min/1.73m2    GFR est non-AA 49 (L) >60 ml/min/1.73m2    Calcium 8.5 8.5 - 10.1 MG/DL    Bilirubin, total 4.1 (H) 0.2 - 1.0 MG/DL    ALT (SGPT) 55 16 - 61 U/L    AST (SGOT) 82 (H) 10 - 38 U/L    Alk.  phosphatase 172 (H) 45 - 117 U/L    Protein, total 5.8 (L) 6.4 - 8.2 g/dL    Albumin 2.4 (L) 3.4 - 5.0 g/dL    Globulin 3.4 2.0 - 4.0 g/dL    A-G Ratio 0.7 (L) 0.8 - 1.7     METABOLIC PANEL, BASIC    Collection Time: 02/15/22  2:20 AM   Result Value Ref Range    Sodium 126 (L) 136 - 145 mmol/L    Potassium 4.8 3.5 - 5.5 mmol/L    Chloride 94 (L) 100 - 111 mmol/L    CO2 23 21 - 32 mmol/L    Anion gap 9 3.0 - 18 mmol/L    Glucose 144 (H) 74 - 99 mg/dL    BUN 32 (H) 7.0 - 18 MG/DL    Creatinine 1.23 0.6 - 1.3 MG/DL    BUN/Creatinine ratio 26 (H) 12 - 20 GFR est AA >60 >60 ml/min/1.73m2    GFR est non-AA 59 (L) >60 ml/min/1.73m2    Calcium 8.6 8.5 - 10.1 MG/DL   CBC WITH AUTOMATED DIFF    Collection Time: 02/15/22  2:20 AM   Result Value Ref Range    WBC 6.4 4.6 - 13.2 K/uL    RBC 2.38 (L) 4.35 - 5.65 M/uL    HGB 9.0 (L) 13.0 - 16.0 g/dL    HCT 25.1 (L) 36.0 - 48.0 %    .5 (H) 78.0 - 100.0 FL    MCH 37.8 (H) 24.0 - 34.0 PG    MCHC 35.9 31.0 - 37.0 g/dL    RDW 14.4 11.6 - 14.5 %    PLATELET 73 (L) 239 - 420 K/uL    MPV 9.4 9.2 - 11.8 FL    NRBC 0.0 0  WBC    ABSOLUTE NRBC 0.00 0.00 - 0.01 K/uL    NEUTROPHILS 68 40 - 73 %    LYMPHOCYTES 11 (L) 21 - 52 %    MONOCYTES 14 (H) 3 - 10 %    EOSINOPHILS 5 0 - 5 %    BASOPHILS 1 0 - 2 %    IMMATURE GRANULOCYTES 1 (H) 0.0 - 0.5 %    ABS. NEUTROPHILS 4.3 1.8 - 8.0 K/UL    ABS. LYMPHOCYTES 0.7 (L) 0.9 - 3.6 K/UL    ABS. MONOCYTES 0.9 0.05 - 1.2 K/UL    ABS. EOSINOPHILS 0.3 0.0 - 0.4 K/UL    ABS. BASOPHILS 0.1 0.0 - 0.1 K/UL    ABS. IMM.  GRANS. 0.1 (H) 0.00 - 0.04 K/UL    DF AUTOMATED     AMMONIA    Collection Time: 02/15/22  2:20 AM   Result Value Ref Range    Ammonia 75 (H) 11 - 32 UMOL/L   TSH 3RD GENERATION    Collection Time: 02/15/22  2:20 AM   Result Value Ref Range    TSH 1.94 0.36 - 3.74 uIU/mL   PROTHROMBIN TIME + INR    Collection Time: 02/15/22  2:21 AM   Result Value Ref Range    Prothrombin time 18.1 (H) 11.5 - 15.2 sec    INR 1.6 (H) 0.8 - 1.2     CELL COUNT AND DIFF, BODY FLUID    Collection Time: 02/15/22  8:16 AM   Result Value Ref Range    BODY FLUID TYPE ASCITIC FLUID       FLUID COLOR YELLOW      FLUID APPEARANCE CLOUDY      FLUID RBC CT. 1,300 (A) NRRE /cu mm    FLUID NUCLEATED CELLS 587 (A) NRRE /cu mm    FLD NEUTROPHILS 8 %    FLD BANDS 0 %    FLD LYMPHS 14 %    FLD MONOCYTES 0 %    FLD EOSINS 0 %    MACROPHAGE 78 %   METABOLIC PANEL, COMPREHENSIVE    Collection Time: 02/15/22  1:45 PM   Result Value Ref Range    Sodium 128 (L) 136 - 145 mmol/L    Potassium 5.1 3.5 - 5.5 mmol/L    Chloride 98 (L) 100 - 111 mmol/L    CO2 22 21 - 32 mmol/L    Anion gap 8 3.0 - 18 mmol/L    Glucose 110 (H) 74 - 99 mg/dL    BUN 27 (H) 7.0 - 18 MG/DL    Creatinine 0.96 0.6 - 1.3 MG/DL    BUN/Creatinine ratio 28 (H) 12 - 20      GFR est AA >60 >60 ml/min/1.73m2    GFR est non-AA >60 >60 ml/min/1.73m2    Calcium 8.1 (L) 8.5 - 10.1 MG/DL    Bilirubin, total 3.4 (H) 0.2 - 1.0 MG/DL    ALT (SGPT) 44 16 - 61 U/L    AST (SGOT) 74 (H) 10 - 38 U/L    Alk.  phosphatase 143 (H) 45 - 117 U/L    Protein, total 4.9 (L) 6.4 - 8.2 g/dL    Albumin 2.6 (L) 3.4 - 5.0 g/dL    Globulin 2.3 2.0 - 4.0 g/dL    A-G Ratio 1.1 0.8 - 1.7     CBC W/O DIFF    Collection Time: 02/15/22  1:45 PM   Result Value Ref Range    WBC 6.4 4.6 - 13.2 K/uL    RBC 2.35 (L) 4.35 - 5.65 M/uL    HGB 8.8 (L) 13.0 - 16.0 g/dL    HCT 24.5 (L) 36.0 - 48.0 %    .3 (H) 78.0 - 100.0 FL    MCH 37.4 (H) 24.0 - 34.0 PG    MCHC 35.9 31.0 - 37.0 g/dL    RDW 14.6 (H) 11.6 - 14.5 %    PLATELET 70 (L) 174 - 420 K/uL    MPV 9.3 9.2 - 11.8 FL    NRBC 0.0 0  WBC    ABSOLUTE NRBC 0.00 0.00 - 0.01 K/uL   PROTHROMBIN TIME + INR    Collection Time: 02/15/22  1:45 PM   Result Value Ref Range    Prothrombin time 19.3 (H) 11.5 - 15.2 sec    INR 1.7 (H) 0.8 - 1.2     AMMONIA    Collection Time: 02/15/22  1:45 PM   Result Value Ref Range    Ammonia 71 (H) 11 - 32 UMOL/L         Chemistry Recent Labs     02/15/22  1345 02/15/22  0220 02/14/22  1727   * 144* 204*   * 126* 124*   K 5.1 4.8 5.8*   CL 98* 94* 94*   CO2 22 23 22   BUN 27* 32* 32*   CREA 0.96 1.23 1.45*   CA 8.1* 8.6 8.5   MG  --   --  2.1   AGAP 8 9 8   BUCR 28* 26* 22*   *  --  172*   TP 4.9*  --  5.8*   ALB 2.6*  --  2.4*   GLOB 2.3  --  3.4   AGRAT 1.1  --  0.7*        Lactic Acid Lactic acid   Date Value Ref Range Status   02/04/2022 2.4 (HH) 0.4 - 2.0 MMOL/L Final     Comment:     CALLED TO AND CORRECTLY REPEATED BY:  Pancho Monge MD ER TO [facility] AT [id number] ON [id number]       No results for input(s): LAC in the last 72 hours. Liver Enzymes Protein, total   Date Value Ref Range Status   02/15/2022 4.9 (L) 6.4 - 8.2 g/dL Final     Albumin   Date Value Ref Range Status   02/15/2022 2.6 (L) 3.4 - 5.0 g/dL Final     Globulin   Date Value Ref Range Status   02/15/2022 2.3 2.0 - 4.0 g/dL Final     A-G Ratio   Date Value Ref Range Status   02/15/2022 1.1 0.8 - 1.7   Final     Alk. phosphatase   Date Value Ref Range Status   02/15/2022 143 (H) 45 - 117 U/L Final     Recent Labs     02/15/22  1345 02/14/22  1727   TP 4.9* 5.8*   ALB 2.6* 2.4*   GLOB 2.3 3.4   AGRAT 1.1 0.7*   * 172*        CBC w/Diff Recent Labs     02/15/22  1345 02/15/22  0220 02/14/22  1629   WBC 6.4 6.4 8.4   RBC 2.35* 2.38* 3.07*   HGB 8.8* 9.0* 11.3*   HCT 24.5* 25.1* 31.5*   PLT 70* 73* 103*   GRANS  --  68 69   LYMPH  --  11* 11*   EOS  --  5 5        Cardiac Enzymes Lab Results   Component Value Date/Time    CPK 87 02/14/2022 05:27 PM    CKMB 4.2 (H) 02/14/2022 05:27 PM    CKND1 4.8 (H) 02/14/2022 05:27 PM        BNP No results found for: BNP, BNPP, XBNPT     Coagulation Recent Labs     02/15/22  1345 02/15/22  0221 02/14/22  1410   PTP 19.3* 18.1* 16.4*   INR 1.7* 1.6* 1.4*   APTT  --   --  40.5*         Thyroid  Lab Results   Component Value Date/Time    TSH 1.94 02/15/2022 02:20 AM       No results found for: T4     Urinalysis Lab Results   Component Value Date/Time    Color DARK YELLOW 02/04/2022 01:45 PM    Appearance CLEAR 02/04/2022 01:45 PM    Specific gravity 1.023 02/04/2022 01:45 PM    pH (UA) 5.5 02/04/2022 01:45 PM    Protein Negative 02/04/2022 01:45 PM    Glucose 250 (A) 02/04/2022 01:45 PM    Ketone TRACE (A) 02/04/2022 01:45 PM    Bilirubin SMALL (A) 02/04/2022 01:45 PM    Urobilinogen 1.0 02/04/2022 01:45 PM    Nitrites Negative 02/04/2022 01:45 PM    Leukocyte Esterase Negative 02/04/2022 01:45 PM        Micro  No results for input(s): SDES, CULT in the last 72 hours. No results for input(s): CULT in the last 72 hours. Culture data during this hospitalization. All Micro Results     None             US GUIDE PARACENTESIS    Result Date: 2/15/2022  PROCEDURE:  ULTRASOUND GUIDED THERAPEUTIC PARACENTESIS INDICATION: Symptomatic Ascites PERFORMED BY:  Xi Lopez PA-C ATTENDING PHYSICIAN:  Dejan Wolfe MD SUPERVISION: General TECHNIQUE & FINDINGS: Informed consent was obtained prior to the procedure. Standard pre-procedure time out taken at 0807 hours. Preliminary ultrasound of the abdomen shows a moderate amount of ascites, ultrasound image saved in PACS. A right lower quadrant approach was chosen. Sterile probe cover and gel were used. The skin was marked, prepped and draped in sterile fashion and 1% Lidocaine was used for local anesthesia. A 6 [place] Yueh sheathed needle was advanced into the peritoneal cavity, was connected to a vacuum, and a total of 3.3 liters of clear yellow fluid was removed. A specimen was collected and taken to the lab, as requested. The patient tolerated the procedure well and there were no immediate complications. GUIDANCE: Ultrasound guidance was used to position (and confirm the position of) the Yueh sheathed needle. Image(s) saved in PACS: Ultrasound     Successful ultrasound guided paracentesis of approximately 3.3 liters of clear yellow fluid. ECHO ADULT COMPLETE    Result Date: 2/14/2022    Left Ventricle: Left ventricle is mildly dilated. Normal wall thickness. See diagram for wall motion findings. Normal left ventricular systolic function with a visually estimated EF of 60 - 65%. Grade I diastolic dysfunction with normal LAP.   Left Atrium: Left atrium is mildly dilated.   Right Ventricle: Right ventricle is mildly dilated. Hyperdynamic systolic function.   Right Atrium: Right atrium is mildly dilated.   Mitral Valve: Mildly thickened anterior leaflet. Moderately calcified anterior leaflet. Mild mitral annular calcification. Mildly thickened subvalvular apparatus.  Mild transvalvular regurgitation.   Pulmonary artery : Estimated pulmonary arterial systolic pressure is 40 mmhg. Pulmonary hypertension found to be mild.   Interatrial septum : No interatrial shunt visualized on color Doppler. The septum is bowing into the LA     US DPLX ABD VISC A/V LTD W CONT    Result Date: 2/15/2022  EXAM: ULTRASOUND DUPLEX ABDOMINAL/LIVER VASCULATURE INDICATION: Pre-TIPS evaluation. Cirrhosis with refractory ascites. COMPARISON: No prior studies TECHNIQUE: Multiple gray scale sonographic images of the hepatic vasculature were obtained. Grayscale, color flow Doppler imaging, and velocity spectral waveform analysis of the hepatic vessels was performed (duplex imaging). Contrast examination with MarleneWineMeNowmino microbubbles evaluation performed. ____________________ FINDINGS: Main portal vein is patent with appropriate antegrade/hepatopetal  blood flow demonstrated with color and spectral Doppler imaging. Left and right portal veins demonstrate hepatopetal flow. No portal vein thrombus is identified. Portal vein waveforms are not pulsatile. Diameter of main portal vein is 15 mm. Estimated velocity of main portal vein flow is 27.4 cm/s. Estimated velocity of left portal vein is 19.8 cm/s. Estimated velocity right portal vein is 21.9 cm/s. Hepatic arteries identified with normal antegrade arterial waveforms demonstrated with color and spectral Doppler imaging. Estimated hepatic artery resistive index is 0.86. Normal hepatofugal flow dynamics seen in the hepatic veins with normal flow in the inferior vena cava, demonstrated with color and spectral Doppler imaging. Also noted is nodular echogenic hepatic parenchyma correlating with known cirrhosis. Moderate ascites. ____________________     1. Patent portal veins without evidence of thrombosis. Hepatopedal flow. 2. Patent hepatic veins. 3. Cirrhotic appearing echogenic liver. Perihepatic ascites.         Images report reviewed by me:  CT (Most Recent) (CT chest reviewed by me) No results found for this or any previous visit. CXR reviewed by me:  XR (Most Recent). CXR  reviewed by me and compared with previous CXR No results found for this or any previous visit. ·Please note: Voice-recognition software may have been used to generate this report, which may have resulted in some phonetic-based errors in grammar and contents. Even though attempts were made to correct all the mistakes, some may have been missed, and remained in the body of the document.       Tiffanie Rinaldi MD  2/15/2022

## 2022-02-15 NOTE — ROUTINE PROCESS
Ultrasound guided paracentesis in the right lower quadrant. 3,300  ML of fluid drained. Fluid taken to lab for testing. Patient taken by transport back to room in stable condition.

## 2022-02-15 NOTE — PROGRESS NOTES
1400 Rcvd notice from IR patient will be recovered and VS documented by anesthesia then transferred to ICU  1415 Assumed care of patient. ... Patient lethargic, Ox3... disoriented to time. CRNA at bedside. .. Post-op vital signs completed. .. Light ooze from femoral sheath. ... pressure dressing applied by OR Nurse. ....   Will continue to monitor

## 2022-02-15 NOTE — CONSULTS
Nephrology Consult    Patient: Kristen Fields  Requesting physician: Dr. Mu Cruz  Reason for consult: Hyponatremia    CC: Weakness    History of Present Illness:  Kristen Fields is a 61 y.o. male with a past medical history significant for afib, HTN, cirrhosis following hepatology Dr Diane Hernandez, who presented with weakness, found to have K of 6.3, Na 124. Pt has been having recurrent ascites required paracentesis up to 2 times weekly. Pt was then admitted to [facility] [place], started on medical treatment for hyperkalemia. His Nephrology was consulted for further evaluation and management of his hyponatremia. Of note, recent serum Na was in the low 120's. Past Medical History:  Patient Active Problem List   Diagnosis Code    Alcoholic liver disease ([facility] K70.9    Cirrhosis ([facility] K74.60    Ascites R18.8    Esophageal varices (HCC) I85.00    Psoriasis L40.9    Paroxysmal atrial fibrillation (HCC) I48.0    Alcohol abuse, in remission F10.11    Chronic anticoagulation Z79.01    Hyponatremia E87.1    Acid reflux K21.9    Hyperkalemia E87.5       Social History:  Social History     Socioeconomic History    Marital status:    Tobacco Use    Smoking status: Never Smoker    Smokeless tobacco: Never Used   Substance and Sexual Activity    Alcohol use: Not Currently    Drug use: Never       Family History:  No family history on file. No kidney disease in the family.     Allergy:  No Known Allergies     Medication:  Home meds: reviewed    Inpatient meds:  Current Facility-Administered Medications   Medication Dose Route Frequency    lidocaine (PF) (XYLOCAINE) 10 mg/mL (1 %) injection 10 mL  10 mL SubCUTAneous RAD ONCE    albumin human 25% (BUMINATE) solution 25 g  25 g IntraVENous Q6H    sodium chloride (NS) flush 5-40 mL  5-40 mL IntraVENous Q8H    sodium chloride (NS) flush 5-40 mL  5-40 mL IntraVENous PRN    acetaminophen (TYLENOL) tablet 650 mg  650 mg Oral Q6H PRN    Or    acetaminophen (TYLENOL) suppository 650 mg  650 mg Rectal Q6H PRN    bisacodyL (DULCOLAX) suppository 10 mg  10 mg Rectal DAILY PRN    promethazine (PHENERGAN) tablet 12.5 mg  12.5 mg Oral Q6H PRN    pantoprazole (PROTONIX) tablet 40 mg  40 mg Oral DAILY    flecainide (TAMBOCOR) tablet 50 mg  50 mg Oral Q12H    0.9% sodium chloride infusion  50 mL/hr IntraVENous CONTINUOUS                        Review of Systems:  Gen: no fever or chills, +generalized weakness  Head: no headache or trauma  Eyes: no change in vision  Throat/Neck: no sore throat or dysphagia  CV: no chest pain or palpitation  Pulm: no cough or hemoptysis  GI: no nausea, vomiting or diarrhea  : no dysuria or hematuria  Skin: no new rash or lesions  Endocrine: no hot or cold intolerance  Psych: no anxiety or depression    Vital signs:   Visit Vitals  BP (!) 104/52   Pulse 86   Temp 98.5 °F (36.9 °C)   Resp 16   Ht 6' 2\" (1.88 m)   Wt 100.7 kg (222 lb 0.1 oz)   SpO2 100%   BMI 28.50 kg/m²       Intake/Output Summary (Last 24 hours) at 2/15/2022 2478  Last data filed at 2/14/2022 1935  Gross per 24 hour   Intake 480 ml   Output 150 ml   Net 330 ml       Physical Exam:    General: No acute distress   HENT: Atraumatic and normocephalic   Eyes: Normal conjunctiva, EOMI   Neck: Supple with no mass   Cardiovascular: Normal S1 & S2, no m/r/g   Pulmonary/Chest Wall: Clear to auscultation bilaterally, no w/c   Abdominal: Soft and non-tender, normal active bowel sound, +distension   Musculoskeletal: edema lower ext bilaterraly   Skin: No lesions   Neurological: No focal neurological deficits       Data Review:  CMP:   Lab Results   Component Value Date/Time     (L) 02/15/2022 02:20 AM    K 4.8 02/15/2022 02:20 AM    CL 94 (L) 02/15/2022 02:20 AM    CO2 23 02/15/2022 02:20 AM    AGAP 9 02/15/2022 02:20 AM     (H) 02/15/2022 02:20 AM    BUN 32 (H) 02/15/2022 02:20 AM    CREA 1.23 02/15/2022 02:20 AM    GFRAA >60 02/15/2022 02:20 AM    GFRNA 59 (L) 02/15/2022 02:20 AM CA 8.6 02/15/2022 02:20 AM    MG 2.1 02/14/2022 05:27 PM    ALB 2.4 (L) 02/14/2022 05:27 PM    TP 5.8 (L) 02/14/2022 05:27 PM    GLOB 3.4 02/14/2022 05:27 PM    AGRAT 0.7 (L) 02/14/2022 05:27 PM    ALT 55 02/14/2022 05:27 PM     CBC:   Lab Results   Component Value Date/Time    WBC 6.4 02/15/2022 01:45 PM    HGB 8.8 (L) 02/15/2022 01:45 PM    HCT 24.5 (L) 02/15/2022 01:45 PM    PLT 70 (L) 02/15/2022 01:45 PM     All Cardiac Markers in the last 24 hours:   Lab Results   Component Value Date/Time    CPK 87 02/14/2022 05:27 PM    CKMB 4.2 (H) 02/14/2022 05:27 PM    CKND1 4.8 (H) 02/14/2022 05:27 PM     Recent Glucose Results:   Lab Results   Component Value Date/Time     (H) 02/15/2022 02:20 AM     (H) 02/14/2022 05:27 PM     ABG: No results found for: PH, PHI, PCO2, PCO2I, PO2, PO2I, HCO3, HCO3I, FIO2, FIO2I  COAGS:   Lab Results   Component Value Date/Time    PTP 19.3 (H) 02/15/2022 01:45 PM    INR 1.7 (H) 02/15/2022 01:45 PM     Liver Panel:   Lab Results   Component Value Date/Time    ALB 2.4 (L) 02/14/2022 05:27 PM    TP 5.8 (L) 02/14/2022 05:27 PM    GLOB 3.4 02/14/2022 05:27 PM    AGRAT 0.7 (L) 02/14/2022 05:27 PM    ALT 55 02/14/2022 05:27 PM     (H) 02/14/2022 05:27 PM       Paracentesis 2/15/22:    Informed consent was obtained prior to the procedure. Standard pre-procedure  time out taken at 0807 hours. Preliminary ultrasound of the abdomen shows a  moderate amount of ascites, ultrasound image saved in PACS. A right lower  quadrant approach was chosen. Sterile probe cover and gel were used. The skin  was marked, prepped and draped in sterile fashion and 1% Lidocaine was used for  local anesthesia. A 6 [employer] Yueh sheathed needle was advanced into the  peritoneal cavity, was connected to a vacuum, and a total of 3.3 liters of clear  yellow fluid was removed. A specimen was collected and taken to the lab, as  requested.  The patient tolerated the procedure well and there were no immediate  complications.     GUIDANCE: Ultrasound guidance was used to position (and confirm the position of)  the Yueh sheathed needle. Image(s) saved in PACS: Ultrasound       Kidney ultrasound:    None    Impression:     1. Hyponatremia, chronic, in the setting of cirrhosis and refractory ascites. Diuretics placed on hold and Na has been improving on gentle NS  2. Hyperkalemia, treated  3. Cirrhosis  4. Refractory ascites  5. Anemia    Plan:     Check urine Na and osm  Check serum osm and TSH  Cont gentle IV NS  Goal Na correction 6-8 mmol/L per 24hrs  Monitor serum Na every 4hrs  Pending TIPS per team    Thanks for inviting us to participate in this patient's medical care.   We'll follow the patient closely with the medical team.    MD Valentin Edwards  [phone]

## 2022-02-15 NOTE — PROGRESS NOTES
Hospitalist Progress Note-critical care note     Patient: Piter Coronado MRN: [id number]  CSN: [id number]    YOB: 1955  Age: 61 y.o. Sex: male    DOA: 2/14/2022 LOS:  LOS: 1 day            Chief complaint: hyponatremia, hyperkalemia . Cirrhosis, paf ascites     Assessment/Plan         Hospital Problems  Date Reviewed: 2/9/2022          Codes Class Noted POA    * (Principal) Hyperkalemia ICD-10-CM: E87.5  ICD-9-CM: 276.7  2/14/2022 Unknown        Hyponatremia ICD-10-CM: E87.1  ICD-9-CM: 276.1  1/23/2022 Unknown        Acid reflux ICD-10-CM: K21.9  ICD-9-CM: 530.81  Unknown Yes        Alcoholic liver disease ([facility] ICD-10-CM: K70.9  ICD-9-CM: 571.3  12/13/2021 Yes        Cirrhosis ([facility] ICD-10-CM: K74.60  ICD-9-CM: 571.5  12/13/2021 Yes        Ascites ICD-10-CM: R18.8  ICD-9-CM: 789.59  12/13/2021 Yes        Esophageal varices ([facility] ICD-10-CM: I85.00  ICD-9-CM: 456.1  12/13/2021 Yes        Paroxysmal atrial fibrillation (HCC) ICD-10-CM: I48.0  ICD-9-CM: 427.31  12/13/2021 Yes        Alcohol abuse, in remission ICD-10-CM: F10.11  ICD-9-CM: 305.03  12/13/2021 Yes            Hyperkalemia-resolved   Received Lasix, Kayexalate, calcium gluconate, insulin/d50 on admission   Monitor potassium  Repeated 4.8 am         Hyponatremia, chronic   hold diuretics, normal saline infusion low rate  Nephrologist called   Improving 966         Alcoholic liver disease  In remission      Cirrhosis, varices   Continue  albumin  Due to alcoholic liver disease   Dr. Henry Reyna f/u            A. Fib, chronic ac   Continue home medication, balance electrolytes  On eliquis at home-hold for procedure      Thrombocytopenia  Due to cirrhosis  Continue monitoring        Ascites  Paracentesis done -TIPS today   Will transfer to icu after the procedure -discussed with Dr. Isa Maria         Reflux disease   ppi      ammonia level up-a little bit confused-wife reported \"slow \" put low dose lactulose       Wife is at the bedside. All questions have been answered. 35 total min's spent on patient care including >50% on counseling/coordinating care. Discussed the above assessments. also discussed labs, medications and hospital course    Disposition :tbd,   Review of systems:    General: No fevers or chills. Cardiovascular: No chest pain or pressure. No palpitations. Pulmonary: No shortness of breath. Gastrointestinal: No nausea, vomiting. Vital signs/Intake and Output:  Visit Vitals  BP (!) 103/47   Pulse 86   Temp 98.1 °F (36.7 °C)   Resp 16   Ht 6' 2\" (1.88 m)   Wt 100.7 kg (222 lb 0.1 oz)   SpO2 100%   BMI 28.50 kg/m²     Current Shift:  02/15 0701 - 02/15 1900  In: 8950 [I.V.:1220]  Out: -   Last three shifts:  02/13 1901 - 02/15 0700  In: 480 [P.O.:480]  Out: 150 [Urine:150]    Physical Exam:  General: WD, WN. Alert, cooperative, no acute distress    HEENT: NC, Atraumatic. PERRLA, anicteric sclerae. Lungs: CTA Bilaterally. No Wheezing/Rhonchi/Rales. Heart:  Regular  rhythm,  No murmur, No Rubs, No Gallops  Abdomen: Soft, +distended, Non tender. +Bowel sounds,   Extremities: No c/c/e  Psych:   Not anxious or agitated.   Neurologic:  No acute neurological deficit, but slow           Labs: Results:       Chemistry Recent Labs     02/15/22  0220 02/14/22  1727 02/14/22  1410   * 204* 126*   * 124* 124*   K 4.8 5.8* 6.3*   CL 94* 94* 94*   CO2 23 22 22   BUN 32* 32* 32*   CREA 1.23 1.45* 1.39*   CA 8.6 8.5 8.9   AGAP 9 8 8   BUCR 26* 22* 23*   AP  --  172*  --    TP  --  5.8*  --    ALB  --  2.4*  --    GLOB  --  3.4  --    AGRAT  --  0.7*  --       CBC w/Diff Recent Labs     02/15/22  1345 02/15/22  0220 02/14/22  1629   WBC 6.4 6.4 8.4   RBC 2.35* 2.38* 3.07*   HGB 8.8* 9.0* 11.3*   HCT 24.5* 25.1* 31.5*   PLT 70* 73* 103*   GRANS  --  68 69   LYMPH  --  11* 11*   EOS  --  5 5      Cardiac Enzymes Recent Labs     02/14/22  1727   CPK 87   CKND1 4.8*      Coagulation Recent Labs     02/15/22  1345 02/15/22  0221 02/14/22  1410 02/14/22  1410   PTP 19.3* 18.1*   < > 16.4*   INR 1.7* 1.6*   < > 1.4*   APTT  --   --   --  40.5*    < > = values in this interval not displayed. Lipid Panel Lab Results   Component Value Date/Time    Cholesterol, total 182 09/13/2021 10:30 AM    HDL Cholesterol 52 09/13/2021 10:30 AM    LDL,Direct 173 (H) 05/30/2019 10:15 AM    LDL, calculated 109 09/13/2021 10:30 AM    Triglyceride 105 09/13/2021 10:30 AM    CHOL/HDL Ratio 3.5 09/13/2021 10:30 AM      BNP No results for input(s): BNPP in the last 72 hours. Liver Enzymes Recent Labs     02/14/22  1727   TP 5.8*   ALB 2.4*   *      Thyroid Studies Lab Results   Component Value Date/Time    TSH 1.94 02/15/2022 02:20 AM        Procedures/imaging: see electronic medical records for all procedures/Xrays and details which were not copied into this note but were reviewed prior to creation of Plan    US GUIDE PARACENTESIS    Result Date: 2/15/2022  PROCEDURE:  ULTRASOUND GUIDED THERAPEUTIC PARACENTESIS INDICATION: Symptomatic Ascites PERFORMED BY:  Raguel Nissen, PA-C ATTENDING PHYSICIAN:  Max Harris MD SUPERVISION: General TECHNIQUE & FINDINGS: Informed consent was obtained prior to the procedure. Standard pre-procedure time out taken at 0807 hours. Preliminary ultrasound of the abdomen shows a moderate amount of ascites, ultrasound image saved in PACS. A right lower quadrant approach was chosen. Sterile probe cover and gel were used. The skin was marked, prepped and draped in sterile fashion and 1% Lidocaine was used for local anesthesia. A 6 [employer] Yueh sheathed needle was advanced into the peritoneal cavity, was connected to a vacuum, and a total of 3.3 liters of clear yellow fluid was removed. A specimen was collected and taken to the lab, as requested. The patient tolerated the procedure well and there were no immediate complications.  GUIDANCE: Ultrasound guidance was used to position (and confirm the position of) the [employer] sheathed needle. Image(s) saved in PACS: Ultrasound     Successful ultrasound guided paracentesis of approximately 3.3 liters of clear yellow fluid. ECHO ADULT COMPLETE    Result Date: 2/14/2022    Left Ventricle: Left ventricle is mildly dilated. Normal wall thickness. See diagram for wall motion findings. Normal left ventricular systolic function with a visually estimated EF of 60 - 65%. Grade I diastolic dysfunction with normal LAP.   Left Atrium: Left atrium is mildly dilated.   Right Ventricle: Right ventricle is mildly dilated. Hyperdynamic systolic function.   Right Atrium: Right atrium is mildly dilated.   Mitral Valve: Mildly thickened anterior leaflet. Moderately calcified anterior leaflet. Mild mitral annular calcification. Mildly thickened subvalvular apparatus. Mild transvalvular regurgitation.   Pulmonary artery : Estimated pulmonary arterial systolic pressure is 40 mmhg. Pulmonary hypertension found to be mild.   Interatrial septum : No interatrial shunt visualized on color Doppler. The septum is bowing into the LA     US DPLX ABD VISC A/V LTD W CONT    Result Date: 2/15/2022  EXAM: ULTRASOUND DUPLEX ABDOMINAL/LIVER VASCULATURE INDICATION: Pre-TIPS evaluation. Cirrhosis with refractory ascites. COMPARISON: No prior studies TECHNIQUE: Multiple gray scale sonographic images of the hepatic vasculature were obtained. Grayscale, color flow Doppler imaging, and velocity spectral waveform analysis of the hepatic vessels was performed (duplex imaging). Contrast examination with Marlene Smith microbubbles evaluation performed. ____________________ FINDINGS: Main portal vein is patent with appropriate antegrade/hepatopetal  blood flow demonstrated with color and spectral Doppler imaging. Left and right portal veins demonstrate hepatopetal flow. No portal vein thrombus is identified. Portal vein waveforms are not pulsatile. Diameter of main portal vein is 15 mm.  Estimated velocity of main portal vein flow is 27.4 cm/s. Estimated velocity of left portal vein is 19.8 cm/s. Estimated velocity right portal vein is 21.9 cm/s. Hepatic arteries identified with normal antegrade arterial waveforms demonstrated with color and spectral Doppler imaging. Estimated hepatic artery resistive index is 0.86. Normal hepatofugal flow dynamics seen in the hepatic veins with normal flow in the inferior vena cava, demonstrated with color and spectral Doppler imaging. Also noted is nodular echogenic hepatic parenchyma correlating with known cirrhosis. Moderate ascites. ____________________     1. Patent portal veins without evidence of thrombosis. Hepatopedal flow. 2. Patent hepatic veins. 3. Cirrhotic appearing echogenic liver. Perihepatic ascites.       Trung Lee MD

## 2022-02-15 NOTE — PROGRESS NOTES
Occupational Therapy Evaluation Attempt     OT eval orders received. Chart reviewed. Attempted Occupational Therapy Evaluation, however, patient unable to be seen due to:  []  Nausea/vomiting  []  Eating  []  Pain  []  Patient too lethargic  []  Off Unit for testing/procedure  []  Dialysis treatment in progress  []  Telemetry Results  [x]  Other: Patient participating with PT at this time. Possible schedule for TIPS procedure later today. Second attempt 1155: pt getting off the unit for procedure. Will f/u later.   Lizbeth Ritchie, OTR/L

## 2022-02-15 NOTE — PROGRESS NOTES
1430: Pt was brought over from IR. Pt had some bleeding at site of sheath. RN who came with pt removed old dressing and applied pressure and redressed site. 1459: Pt sheath site is clean dry and intact at this time. 1613: Pt resting no sign of distress. Call light within reach. Pt sheath site is clean dry and intact.

## 2022-02-15 NOTE — H&P
History & Physical    Patient: Jimbo Healy MRN: [id number]  [facility]: [id number]    YOB: 1955  Age: 61 y.o. Sex: male      DOA: 2/14/2022  Primary Care Provider:  Antonette Miller MD      Assessment/Plan     Hospital Problems  Date Reviewed: 2/9/2022          Codes Class Noted POA    Hyperkalemia ICD-10-CM: E87.5  ICD-9-CM: 276.7  2/14/2022 Unknown        Hyponatremia ICD-10-CM: E87.1  ICD-9-CM: 276.1  1/23/2022 Unknown        Acid reflux ICD-10-CM: K21.9  ICD-9-CM: 530.81  Unknown Yes        Alcoholic liver disease ([facility] [address] ICD-10-CM: K70.9  ICD-9-CM: 571.3  12/13/2021 Yes        Cirrhosis ([facility] [address] ICD-10-CM: K74.60  ICD-9-CM: 571.5  12/13/2021 Yes        Ascites ICD-10-CM: R18.8  ICD-9-CM: 789.59  12/13/2021 Yes        Esophageal varices ([facility] [address] ICD-10-CM: I85.00  ICD-9-CM: 456.1  12/13/2021 Yes        Paroxysmal atrial fibrillation (HCC) ICD-10-CM: I48.0  ICD-9-CM: 427.31  12/13/2021 Yes        Alcohol abuse, in remission ICD-10-CM: F10.11  ICD-9-CM: 305.03  12/13/2021 Yes                Admit to tele     Hyperkalemia  Lasix, Kayexalate, calcium gluconate, insulin/d50   Monitor potassium      Hyponatremia, chronic   hold diuretics, normal saline infusion low rate  Nephrology consult         Alcoholic liver disease  In remission      Cirrhosis, varices   We will give albumin  Due to alcoholic liver disease   Check ammonia level           A. Fib, chronic ac   Continue home medication, balance electrolytes  On eliquis at home will continue      Thrombocytopenia  Due to cirrhosis  103  Continue monitoring        Ascites  Received paracentesis ,  Will do paracentesis tomorrow  Needs paracentesis twice per week         Reflux disease   ppi     Please note that this dictation was completed with [employer], the [employer] voice recognition software. Quite often unanticipated grammatical, syntax, homophones, and other interpretive errors are inadvertently transcribed by the computer software.   Please disregard these errors. Please excuse any errors that have escaped final proofreading    Estimate  length of stay : TBD     DVT : heparin ppi proph  CC: abnormal lab        HPI:     Javier Castle is a 61 y.o. male with cirrhosis, hypertension PAF presented to ER due to abnormal lab. He follow-up with Dr. Lenny Squires for his cirrhosis,  he was found potassium 6.3. Repeated in ER 5.8 sodium was 124. He has recurrent ascites-needs paracentesis twice a week. Follow-up with Dr. Lenny Squires, need to further evaluation for possible TIPS. Patient denies any abdominal pain. Received covid 19 vaccine . Denies any slurred speech/headache/cp/n/v/blurred vission/d/c/palpitation/gait change/bleeding. Denies smoking/ any alcohol or drug use.        Visit Vitals  /65   Pulse 85   Temp 97.3 °F (36.3 °C)   Resp 16   Ht 6' 2\" (1.88 m)   Wt 100.7 kg (222 lb 0.1 oz)   SpO2 100%   BMI 28.50 kg/m²             Past Medical History:   Diagnosis Date    Acid reflux      Surgical History      Surgery Date Site/Laterality Comments   HERNIA REPAIR          VASECTOMY          COLONOSCOPY 12/14/2017 N/A/N/A Procedure: COLONOSCOPY FLEXIBLE / EGD; Surgeon: Sravan Obregon MD     EGD 12/14/2017 N/A/N/A Procedure: ESOPHAGOGASTRODUODENOSCOPY FLEXIBLE TRANSORAL DIAGNOSTIC; Surgeon: Sravan Obregon MD     EGD WITH BIOPSY 12/14/2017 N/A/N/A Procedure: ESOPHAGOGASTRODUODENOSCOPY FLEXIBLE TRANSORAL WITH BIOPSY; Surgeon: Sravan Obregon MD     COLONOSCOPY WITH BIOPSY 12/14/2017 N/A/N/A Procedure: COLONOSCOPY FLEXIBLE WITH BIOPSY; Surgeon: Sravan Obregon MD     EGD 6/6/2018 N/A/N/A Procedure: ESOPHAGOGASTRODUODENOSCOPY FLEXIBLE TRANSORAL DIAGNOSTIC; Surgeon: Sravan Obrgeon MD     EGD WITH VARICE BANDING LIGATION 6/6/2018 N/A/N/A Procedure: ESOPHAGOGASTRODUODENOSCOPY FLEXIBLE TRANSORAL WITH BAND LIGATION OF ESOPHAGEAL/ STOMACH VARICES; Surgeon: Sravan Obregon MD     EGD 9/24/2018 N/A/N/A Procedure: EGD, DIAGNOSTIC; Surgeon: Sravan Obregon MD     CARDIOVERSION ELECTIVE EXTERNL 10/11/2019        EGD 2021 N/A/N/A Procedure: EGD, DIAGNOSTIC; Surgeon: Rich Sanchez MD      Family History      Relation Name Status Comments   Father        Mother             Social History     Socioeconomic History    Marital status:    Tobacco Use    Smoking status: Never Smoker    Smokeless tobacco: Never Used   Substance and Sexual Activity    Alcohol use: Not Currently    Drug use: Never       Prior to Admission medications    Medication Sig Start Date End Date Taking? Authorizing Provider   furosemide (LASIX) 40 mg tablet Take 1 Tablet by mouth daily. 22   Christine Diaz MD   spironolactone (Aldactone) 100 mg tablet Take 1 Tablet by mouth daily. 22   Christine Diaz MD   Eliquis 5 mg tablet  21   Provider, Historical   flecainide (TAMBOCOR) 100 mg tablet Take 50 mg by mouth every twelve (12) hours. Provider, Historical   pantoprazole (PROTONIX) 20 mg tablet Take 20 mg by mouth daily. Provider, Historical   sildenafil citrate (VIAGRA) 100 mg tablet take 1 tablet by mouth 1 hour prior to intercourse 21   Provider, Historical   ustekinaumab 90 mg/mL injection  21   Provider, Historical       No Known Allergies    Review of Systems  Gen: No fever, chills, malaise, weight loss/gain. Heent: No headache, rhinorrhea, epistaxis, ear pain, hearing loss, sinus pain, neck pain/stiffness, sore throat. Heart: No chest pain, palpitations, RAMIREZ, pnd, or orthopnea. Resp: No cough, hemoptysis, wheezing and shortness of breath. GI: No nausea, vomiting, diarrhea, constipation, melena or hematochezia. + distended abdomen   : No urinary obstruction, dysuria or hematuria. Derm: No rash, new skin lesion or pruritis. Musc/skeletal: no bone or joint complains. Vasc: No edema, cyanosis or claudication. Endo: No heat/cold intolerance, no polyuria,polydipsia or polyphagia. Neuro: No unilateral weakness, numbness, tingling. No seizures. Heme: No easy bruising or bleeding. Physical Exam:     Physical Exam:  Visit Vitals  /65   Pulse 85   Temp 97.3 °F (36.3 °C)   Resp 16   Ht 6' 2\" (1.88 m)   Wt 100.7 kg (222 lb 0.1 oz)   SpO2 100%   BMI 28.50 kg/m²           Temp (24hrs), Av.3 °F (36.3 °C), Min:97.3 °F (36.3 °C), Max:97.3 °F (36.3 °C)    No intake/output data recorded. No intake/output data recorded. General:  Awake, cooperative, no distress. Head:  Normocephalic, without obvious abnormality, atraumatic. Eyes:  Conjunctivae/corneas clear, sclera anicteric, PERRL, EOMs intact. Nose: Nares normal. No drainage or sinus tenderness. Throat: Lips, mucosa, and tongue normal. .   Neck: Supple, symmetrical, trachea midline, no adenopathy. Lungs:   Clear to auscultation bilaterally. Heart:  Regular rate and rhythm, S1, S2 normal, no murmur, click, rub or gallop. Abdomen: Soft, distended, non-tender. Bowel sounds normal. No masses,  No organomegaly. Extremities: Extremities normal, atraumatic, no cyanosis or edema. Pulses: 2+ and symmetric all extremities. Skin: Skin color-pink, texture, turgor normal. No rashes or lesions. Capillary refill normal    Neurologic: CNII-XII intact. No focal motor or sensory deficit.        Labs Reviewed:    BMP:   Lab Results   Component Value Date/Time     (L) 2022 05:27 PM    K 5.8 (H) 2022 05:27 PM    CL 94 (L) 2022 05:27 PM    CO2 22 2022 05:27 PM    AGAP 8 2022 05:27 PM     (H) 2022 05:27 PM    BUN 32 (H) 2022 05:27 PM    CREA 1.45 (H) 2022 05:27 PM    GFRAA 59 (L) 2022 05:27 PM    GFRNA 49 (L) 2022 05:27 PM     CMP:   Lab Results   Component Value Date/Time     (L) 2022 05:27 PM    K 5.8 (H) 2022 05:27 PM    CL 94 (L) 2022 05:27 PM    CO2 22 2022 05:27 PM    AGAP 8 2022 05:27 PM     (H) 2022 05:27 PM    BUN 32 (H) 2022 05:27 PM CREA 1.45 (H) 02/14/2022 05:27 PM    GFRAA 59 (L) 02/14/2022 05:27 PM    GFRNA 49 (L) 02/14/2022 05:27 PM    CA 8.5 02/14/2022 05:27 PM    MG 2.1 02/14/2022 05:27 PM    ALB 2.4 (L) 02/14/2022 05:27 PM    TP 5.8 (L) 02/14/2022 05:27 PM    GLOB 3.4 02/14/2022 05:27 PM    AGRAT 0.7 (L) 02/14/2022 05:27 PM    ALT 55 02/14/2022 05:27 PM     CBC:   Lab Results   Component Value Date/Time    WBC 8.4 02/14/2022 04:29 PM    HGB 11.3 (L) 02/14/2022 04:29 PM    HCT 31.5 (L) 02/14/2022 04:29 PM     (L) 02/14/2022 04:29 PM     All Cardiac Markers in the last 24 hours:   Lab Results   Component Value Date/Time    CPK 87 02/14/2022 05:27 PM    CKMB 4.2 (H) 02/14/2022 05:27 PM    CKND1 4.8 (H) 02/14/2022 05:27 PM     Recent Glucose Results:   Lab Results   Component Value Date/Time     (H) 02/14/2022 05:27 PM     ABG: No results found for: PH, PHI, PCO2, PCO2I, PO2, PO2I, HCO3, HCO3I, FIO2, FIO2I  COAGS: No results found for: APTT, PTP, INR, INREXT, INREXT  Liver Panel:   Lab Results   Component Value Date/Time    ALB 2.4 (L) 02/14/2022 05:27 PM    TP 5.8 (L) 02/14/2022 05:27 PM    GLOB 3.4 02/14/2022 05:27 PM    AGRAT 0.7 (L) 02/14/2022 05:27 PM    ALT 55 02/14/2022 05:27 PM     (H) 02/14/2022 05:27 PM     Pancreatic Markers: No results found for: AMYLPOCT, AML, LIPPOCT, LPSE    ECHO ADULT COMPLETE    Result Date: 2/14/2022    Left Ventricle: Left ventricle is mildly dilated. Normal wall thickness. See diagram for wall motion findings. Normal left ventricular systolic function with a visually estimated EF of 60 - 65%. Grade I diastolic dysfunction with normal LAP.   Left Atrium: Left atrium is mildly dilated.   Right Ventricle: Right ventricle is mildly dilated. Hyperdynamic systolic function.   Right Atrium: Right atrium is mildly dilated.   Mitral Valve: Mildly thickened anterior leaflet. Moderately calcified anterior leaflet. Mild mitral annular calcification. Mildly thickened subvalvular apparatus. Mild transvalvular regurgitation.   Pulmonary artery : Estimated pulmonary arterial systolic pressure is 40 mmhg. Pulmonary hypertension found to be mild.   Interatrial septum : No interatrial shunt visualized on color Doppler.  The septum is bowing into the LA     Procedures/imaging: see electronic medical records for all procedures/Xrays and details which were not copied into this note but were reviewed prior to creation of Rena Sarah MD, Internal Medicine     CC: Raul Riley MD

## 2022-02-15 NOTE — PROGRESS NOTES
conducted an initial consultation and Spiritual Assessment for Lawrence Aguirre, who is a 61 y.o.,male. Patient's Primary Language is: [place]. According to the patient's EMR Congregation Affiliation is: No Lutheran. The reason the Patient came to the hospital is:   Patient Active Problem List    Diagnosis Date Noted    Hyperkalemia 02/14/2022    Hyponatremia 01/23/2022    Acid reflux     Alcoholic liver disease ([facility] [address] 12/13/2021    Cirrhosis ([facility] [address] 12/13/2021    Ascites 12/13/2021    Esophageal varices ([facility] [address] 12/13/2021    Psoriasis 12/13/2021    [facility] 12/13/2021    Alcohol abuse, in remission 12/13/2021    Chronic anticoagulation 12/13/2021        The  provided the following Interventions:  Initiated a relationship of care and support. Explored issues of toyin, belief, spirituality and Druze/ritual needs while hospitalized. Listened empathically. Offered prayer and assurance of continued prayers on patient's behalf. The following outcomes where achieved:  Patient shared limited information about both their medical narrative and spiritual journey/beliefs.  confirmed Patient's Congregation Affiliation. Patient processed feeling about current hospitalization. Patient expressed gratitude for 's visit. Assessment:  Patient does not have any Druze/cultural needs that will affect patient's preferences in health care. There are no spiritual or Druze issues which require intervention at this time. Plan:  Chaplains will continue to follow and will provide pastoral care on an as needed/requested basis.  recommends bedside caregivers page  on duty if patient shows signs of acute spiritual or emotional distress.     [address]

## 2022-02-15 NOTE — PROCEDURES
2/15/2022    Interventional Radiology Brief Procedure Note     Performed By: Nimesh Price PA-C    Supervising Radiologist:  Keegan Wong MD     Pre-operative Diagnosis:  Symptomatic Ascites     Post-operative Diagnosis: Same as pre-op diagnosis     Procedure(s) Performed: US Guided Paracentesis     Anesthesia:  Local Anesthesia     Findings:  [address] and RLQ approach chosen. 3.3 liters of clear yellow fluid was removed. Please see dictated report for details. Complications: None immediate     Estimated Blood Loss:  Minimal     Tubes and Drains: None     Specimens: Sent for cell count, as requested     Condition: Good     Plan: Return to nursing unit.   Plan per primary team.        Nimesh Price PA-C  [facility]  Vascular & Interventional Radiology  2/15/2022

## 2022-02-15 NOTE — ANESTHESIA PREPROCEDURE EVALUATION
Relevant Problems   NEUROLOGY   (+) Alcohol abuse, in remission      CARDIOVASCULAR   (+) Paroxysmal atrial fibrillation (HCC)      GASTROINTESTINAL   (+) Acid reflux   (+) Alcoholic liver disease (HCC)   (+) Cirrhosis (HCC)       Anesthetic History   No history of anesthetic complications            Review of Systems / Medical History  Patient summary reviewed, nursing notes reviewed and pertinent labs reviewed    Pulmonary  Within defined limits            Pertinent negatives: No sleep apnea and smoker     Neuro/Psych   Within defined limits           Cardiovascular            Dysrhythmias : atrial fibrillation      Exercise tolerance: >4 METS     GI/Hepatic/Renal           Liver disease  Pertinent negatives: No hiatal hernia and GERD   Endo/Other  Within defined limits           Other Findings              Physical Exam    Airway  Mallampati: II  TM Distance: > 6 cm  Neck ROM: normal range of motion   Mouth opening: Normal     Cardiovascular    Rhythm: regular  Rate: normal         Dental  No notable dental hx       Pulmonary  Breath sounds clear to auscultation               Abdominal    Ascites and distended     Other Findings            Anesthetic Plan    ASA: 4  Anesthesia type: general          Induction: Intravenous  Anesthetic plan and risks discussed with: Patient

## 2022-02-15 NOTE — PROCEDURES
Interventional Radiology Brief Procedure Note    Interventional Radiologist: Estelle Bartholomew MD    Pre-operative Diagnosis: Refractory ascites. Post-operative Diagnosis: Same as pre-operative diagnosis    Procedure(s) Performed:  TIPS creation; central vascular sheath placement    Anesthesia:  Local and general anesthesia. Findings: TIPS creation performed via right IJV. Central vascular sheath placed - ready for use. Full report to follow.      Complications: None    Estimated Blood Loss:  minimal    Specimens: None    Condition: Good    Estelle Bartholomew MD  [facility] Radiology [facility]  2/15/2022

## 2022-02-16 ENCOUNTER — APPOINTMENT (OUTPATIENT)
Dept: GENERAL RADIOLOGY | Age: 67
DRG: 982 | End: 2022-02-16
Attending: INTERNAL MEDICINE
Payer: COMMERCIAL

## 2022-02-16 VITALS
DIASTOLIC BLOOD PRESSURE: 56 MMHG | TEMPERATURE: 98.5 F | OXYGEN SATURATION: 100 % | BODY MASS INDEX: 28.49 KG/M2 | HEART RATE: 85 BPM | HEIGHT: 74 IN | RESPIRATION RATE: 24 BRPM | SYSTOLIC BLOOD PRESSURE: 124 MMHG | WEIGHT: 222 LBS

## 2022-02-16 LAB
AMMONIA PLAS-SCNC: 30 UMOL/L (ref 11–32)
ANION GAP SERPL CALC-SCNC: 11 MMOL/L (ref 3–18)
ATRIAL RATE: 87 BPM
BASOPHILS # BLD: 0 K/UL (ref 0–0.1)
BASOPHILS NFR BLD: 0 % (ref 0–2)
BUN SERPL-MCNC: 20 MG/DL (ref 7–18)
BUN/CREAT SERPL: 25 (ref 12–20)
CALCIUM SERPL-MCNC: 8.9 MG/DL (ref 8.5–10.1)
CALCULATED P AXIS, ECG09: 53 DEGREES
CALCULATED R AXIS, ECG10: -35 DEGREES
CALCULATED T AXIS, ECG11: 18 DEGREES
CHLORIDE SERPL-SCNC: 99 MMOL/L (ref 100–111)
CO2 SERPL-SCNC: 20 MMOL/L (ref 21–32)
CREAT SERPL-MCNC: 0.81 MG/DL (ref 0.6–1.3)
DIAGNOSIS, 93000: NORMAL
DIFFERENTIAL METHOD BLD: ABNORMAL
EOSINOPHIL # BLD: 0 K/UL (ref 0–0.4)
EOSINOPHIL NFR BLD: 1 % (ref 0–5)
ERYTHROCYTE [DISTWIDTH] IN BLOOD BY AUTOMATED COUNT: 14.2 % (ref 11.6–14.5)
GLUCOSE SERPL-MCNC: 125 MG/DL (ref 74–99)
HCT VFR BLD AUTO: 26.4 % (ref 36–48)
HGB BLD-MCNC: 9.3 G/DL (ref 13–16)
IMM GRANULOCYTES # BLD AUTO: 0.1 K/UL (ref 0–0.04)
IMM GRANULOCYTES NFR BLD AUTO: 1 % (ref 0–0.5)
LYMPHOCYTES # BLD: 0.4 K/UL (ref 0.9–3.6)
LYMPHOCYTES NFR BLD: 7 % (ref 21–52)
MCH RBC QN AUTO: 36.8 PG (ref 24–34)
MCHC RBC AUTO-ENTMCNC: 35.2 G/DL (ref 31–37)
MCV RBC AUTO: 104.3 FL (ref 78–100)
MONOCYTES # BLD: 0.4 K/UL (ref 0.05–1.2)
MONOCYTES NFR BLD: 8 % (ref 3–10)
NEUTS SEG # BLD: 4.2 K/UL (ref 1.8–8)
NEUTS SEG NFR BLD: 83 % (ref 40–73)
NRBC # BLD: 0 K/UL (ref 0–0.01)
NRBC BLD-RTO: 0 PER 100 WBC
OSMOLALITY SERPL: 267 MOSMOL/KG (ref 280–301)
P-R INTERVAL, ECG05: 166 MS
PLATELET # BLD AUTO: 63 K/UL (ref 135–420)
PMV BLD AUTO: 9.4 FL (ref 9.2–11.8)
POTASSIUM SERPL-SCNC: 4.8 MMOL/L (ref 3.5–5.5)
Q-T INTERVAL, ECG07: 392 MS
QRS DURATION, ECG06: 100 MS
QTC CALCULATION (BEZET), ECG08: 471 MS
RBC # BLD AUTO: 2.53 M/UL (ref 4.35–5.65)
SODIUM SERPL-SCNC: 130 MMOL/L (ref 136–145)
VENTRICULAR RATE, ECG03: 87 BPM
WBC # BLD AUTO: 5 K/UL (ref 4.6–13.2)

## 2022-02-16 PROCEDURE — 74011250637 HC RX REV CODE- 250/637: Performed by: INTERNAL MEDICINE

## 2022-02-16 PROCEDURE — 74011000250 HC RX REV CODE- 250: Performed by: HOSPITALIST

## 2022-02-16 PROCEDURE — 82140 ASSAY OF AMMONIA: CPT

## 2022-02-16 PROCEDURE — 80048 BASIC METABOLIC PNL TOTAL CA: CPT

## 2022-02-16 PROCEDURE — 74011250636 HC RX REV CODE- 250/636: Performed by: HOSPITALIST

## 2022-02-16 PROCEDURE — 71045 X-RAY EXAM CHEST 1 VIEW: CPT

## 2022-02-16 PROCEDURE — P9047 ALBUMIN (HUMAN), 25%, 50ML: HCPCS | Performed by: HOSPITALIST

## 2022-02-16 PROCEDURE — 74011250637 HC RX REV CODE- 250/637: Performed by: HOSPITALIST

## 2022-02-16 PROCEDURE — 36415 COLL VENOUS BLD VENIPUNCTURE: CPT

## 2022-02-16 PROCEDURE — 85025 COMPLETE CBC W/AUTO DIFF WBC: CPT

## 2022-02-16 PROCEDURE — 99223 1ST HOSP IP/OBS HIGH 75: CPT | Performed by: INTERNAL MEDICINE

## 2022-02-16 RX ORDER — SPIRONOLACTONE 25 MG/1
50 TABLET ORAL DAILY
Status: DISCONTINUED | OUTPATIENT
Start: 2022-02-16 | End: 2022-02-16 | Stop reason: HOSPADM

## 2022-02-16 RX ORDER — SPIRONOLACTONE 50 MG/1
50 TABLET, FILM COATED ORAL DAILY
Qty: 30 TABLET | Refills: 0 | Status: SHIPPED | OUTPATIENT
Start: 2022-02-17

## 2022-02-16 RX ORDER — FUROSEMIDE 20 MG/1
20 TABLET ORAL DAILY
Qty: 30 TABLET | Refills: 0 | Status: SHIPPED | OUTPATIENT
Start: 2022-02-16 | End: 2022-05-12 | Stop reason: SDUPTHER

## 2022-02-16 RX ORDER — FUROSEMIDE 20 MG/1
20 TABLET ORAL DAILY
Status: DISCONTINUED | OUTPATIENT
Start: 2022-02-16 | End: 2022-02-16 | Stop reason: HOSPADM

## 2022-02-16 RX ADMIN — SODIUM CHLORIDE, PRESERVATIVE FREE 10 ML: 5 INJECTION INTRAVENOUS at 06:29

## 2022-02-16 RX ADMIN — SODIUM CHLORIDE 50 ML/HR: 900 INJECTION, SOLUTION INTRAVENOUS at 06:29

## 2022-02-16 RX ADMIN — FLECAINIDE ACETATE 50 MG: 100 TABLET ORAL at 08:10

## 2022-02-16 RX ADMIN — ALBUMIN (HUMAN) 25 G: 0.25 INJECTION, SOLUTION INTRAVENOUS at 11:34

## 2022-02-16 RX ADMIN — SODIUM CHLORIDE, PRESERVATIVE FREE 10 ML: 5 INJECTION INTRAVENOUS at 13:21

## 2022-02-16 RX ADMIN — FUROSEMIDE 20 MG: 20 TABLET ORAL at 08:12

## 2022-02-16 RX ADMIN — ALBUMIN (HUMAN) 25 G: 0.25 INJECTION, SOLUTION INTRAVENOUS at 00:09

## 2022-02-16 RX ADMIN — PANTOPRAZOLE SODIUM 40 MG: 40 TABLET, DELAYED RELEASE ORAL at 08:09

## 2022-02-16 RX ADMIN — ALBUMIN (HUMAN) 25 G: 0.25 INJECTION, SOLUTION INTRAVENOUS at 06:29

## 2022-02-16 RX ADMIN — LACTULOSE 15 ML: 20 SOLUTION ORAL at 08:09

## 2022-02-16 RX ADMIN — SPIRONOLACTONE 50 MG: 25 TABLET ORAL at 08:12

## 2022-02-16 NOTE — PROGRESS NOTES
Bedside and Verbal shift change report given to Dashawn Rodriguez RN (oncoming nurse) by Giselle Adames RN (offgoing nurse). Report included the following information SBAR, Kardex, Procedure Summary, Intake/Output, MAR and Recent Results.

## 2022-02-16 NOTE — PROGRESS NOTES
1002- Patient assisted to [facility]. NAD noted. Patient assisted back to bed. Call bell within reach. Will continue to monitor. 1143- Patient in bed watching TV. Wife at bedside. Patient would like to know when sheath will be pulled. IR paged. Will come when case is over around 1230. Patient updated.

## 2022-02-16 NOTE — PROGRESS NOTES
[facility]  Pulmonary, Critical Care, and Sleep Medicine    Name: Jessica Polanco MRN: [id number]   : 1955 Hospital: [facility] [place]    Date: 2022  Room: [address] [place] [address] Note                                              Consult requesting physician: Dr. Marisol Oates  Reason for Consult: post-TIPS ICU care    IMPRESSION:     · Portal HTN, s/p TIPS - K76.6  Active Hospital Problems    Diagnosis Date Noted    Hyperkalemia 2022    Hyponatremia 2022    Acid reflux     Alcohol abuse, in remission 2021    Ascites 2021    Cirrhosis ([facility] 2021    Esophageal varices ([facility] 2021    Paroxysmal atrial fibrillation ([address]     Alcoholic liver disease ([facility] 2021   ·    Patient Active Problem List   Diagnosis Code    Alcoholic liver disease ([facility] K70.9    Cirrhosis ([address] K74.60    Ascites R18.8    Esophageal varices (HCC) I85.00    Psoriasis L40.9    Paroxysmal atrial fibrillation ([address] I48.0    Alcohol abuse, in remission F10.11    Hyponatremia E87.1    Acid reflux K21.9    Hyperkalemia E87.5   Code status: Full Code     RECOMMENDATIONS:     Respiratory: stable respiratory status, on room air, SPO2 100% on monitor at bedside  Protecting airway  Chest x-ray 22: No acute cardiopulmonary abnormality  Keep SPO2 >=92%. HOB 30 degree elevation all the time. Aggressive pulmonary toileting. Aspiration precautions. Incentive spirometry.     CVS: Hemodynamically stable    ID: No clinical evidence for sepsis    Hematology/Oncology: No evidence of active bleeding anywhere  Stable Hgb and Plt  Monitor CBC  Monitor INR per hepatology    Renal: nephrology following  Correct S. Na+ as needed per nephrology  Chronic hyponatremia likely related to chronic liver disease    GI/: Diet as tolerated  Continue lactulose 3 times a day  No evidence for encephalopathy post-TIPS  Lasix and Spironolactone per hepatology  PPI for GI prophylaxis    Endocrine: Monitor FSBS as needed for any hypoglycemia    Neurology: Monitor mentation for any development of hepatic encephalopathy post-TIPS  AAO x 4. Nonfocal exam    Toxicology: Reports quit EtOH more than 1 year ago    Pain/Sedation: Avoid any sedative, opiate, hypnotic    Skin/Wound: As per nursing care    Electrolytes: Replace electrolytes per ICU electrolyte replacement protocol. IVF: NS at 50 mL an hour per nephrology    Nutrition: Diet as tolerated    Prophylaxis: DVT Prophylaxis: SCD. GI Prophylaxis: PPI. Restraints: none  PT/OT following  Lines/Tubes: PIV, RT IJ sheet - IR aware to remove today  Pt does not have Persaud    Advance Directive/Palliative Care: Consult per clinical course. Transfer to tele with possible DC plan later today  Will defer respective systems problem management to primary and other respective consultant and sign off once out of ICU. Quality Care: PPI, DVT prophylaxis, HOB elevated, Infection control all reviewed and addressed. Care of plan d/w RN, RT, hospitalist Dr. Aviva Rivera  D/w patient (answered all questions to satisfaction). Moderate complexity decision making was performed during the evaluation of this patient at high risk for decompensation with multiple organ involvement. Total critical care time spent rendering care exclusive of procedures/family discussion/coordination of care: 33 minutes. Subjective/History of Present Illness:     Patient is a 61 y.o. male with PMHx significant for alcoholic cirrhosis, esophageal varices, portal hypertension, A. fib, chronic hyponatremia, GERD who presented to [facility] for abnormal labs. Patient reports being scheduled to get US guided paracentesis and underwent preprocedure labs which showed elevated K+ to 6.3 when he was advised to come to the ER for further treatment and work-up. In ER, patient also noted to have TOMA, elevated ammonia.   Hepatology and nephrology were consulted and patient was admitted. During his hospitalization, hyperkalemia and TOMA resolved. Patient received paracentesis and TIPS procedure today as recommended by hepatology and postprocedure patient was transferred to ICU for close observation and care when this consult was requested. The patient denies fever, chills, cough, chest pain, dyspnea, wheezing, hemoptysis, palpitations, syncope, orthopnea, paroxysmal nocturnal dyspnea. Has chronic leg edema BL. The patient denies abdominal or flank pain, anorexia, nausea or vomiting, dysphagia, change in bowel habits or black or bloody stools or weight loss. Pt seen at bedside in ICU rm # [id number]      02/16/22:   Patient seen at bedside in ICU room #108  AAO x4, following all commands, sitting up in bed  On room air, denies any respiratory distress, conversing without difficulty in paragraphs  The patient denies cough, chest pain, dyspnea, wheezing or hemoptysis. Hemodynamically stable; chronic leg edema unchanged  Hepatology started diuretics  Denies any abdominal pain, nausea, vomiting  No fever. Voiding. Good urine output as per staff  Urine output was not recorded by overnight shift  PCCM was not contacted by staff on anything about patient overnight      I/O last 24 hrs:      Intake/Output Summary (Last 24 hours) at 2/16/2022 1239  Last data filed at 2/16/2022 1000  Gross per 24 hour   Intake 1000 ml   Output 400 ml   Net 600 ml       Review of Systems:  General ROS: negative for  - fever, chills  Cardiovascular ROS: negative for - chest pain, murmur, orthopnea, palpitations or pnd  Gastrointestinal ROS: no nausea, vomiting, abdominal pain, change in bowel habits, or black or bloody stools  Genito-Urinary ROS: no dysuria, trouble voiding, or hematuria  Musculoskeletal ROS: negative for - muscle pain or muscular weakness  Neurological ROS: no TIA or stroke symptoms  Dermatological ROS: negative for - pruritus, rash or skin lesion changes       No Known Allergies     Past Medical History: Diagnosis Date    Acid reflux       Past Surgical History:   Procedure Laterality Date    IR INSERT TIPS HEPATIC SHUNT  2/15/2022      Social History     Tobacco Use    Smoking status: Never Smoker    Smokeless tobacco: Never Used   Substance Use Topics    Alcohol use: Not Currently      No family history on file. Prior to Admission medications    Medication Sig Start Date End Date Taking? Authorizing Provider   furosemide (LASIX) 40 mg tablet Take 1 Tablet by mouth daily. 2/2/22   Anisa Diaz MD   spironolactone (Aldactone) 100 mg tablet Take 1 Tablet by mouth daily. 2/2/22   Anisa Diaz MD   Eliquis 5 mg tablet  12/12/21   Provider, Historical   flecainide (TAMBOCOR) 100 mg tablet Take 50 mg by mouth every twelve (12) hours. Provider, Historical   pantoprazole (PROTONIX) 20 mg tablet Take 20 mg by mouth daily.     Provider, Historical   sildenafil citrate (VIAGRA) 100 mg tablet take 1 tablet by mouth 1 hour prior to intercourse 8/12/21   Provider, Historical   ustekinaumab 90 mg/mL injection  12/9/21   Provider, Historical     Current Facility-Administered Medications   Medication Dose Route Frequency    furosemide (LASIX) tablet 20 mg  20 mg Oral DAILY    spironolactone (ALDACTONE) tablet 50 mg  50 mg Oral DAILY    lactulose (CHRONULAC) 10 gram/15 mL solution 15 mL  10 g Oral TID    iopamidoL (ISOVUE 300) 61 % contrast injection 1-100 mL  1-100 mL IntraCATHeter RAD CONTINUOUS    albumin human 25% (BUMINATE) solution 25 g  25 g IntraVENous Q6H    sodium chloride (NS) flush 5-40 mL  5-40 mL IntraVENous Q8H    pantoprazole (PROTONIX) tablet 40 mg  40 mg Oral DAILY    flecainide (TAMBOCOR) tablet 50 mg  50 mg Oral Q12H    0.9% sodium chloride infusion  50 mL/hr IntraVENous CONTINUOUS         Objective:   Vital Signs:    Visit Vitals  BP (!) 129/54   Pulse 81   Temp 98.5 °F (36.9 °C)   Resp 14   Ht 6' 2\" (1.88 m)   Wt 100.7 kg (222 lb 0.1 oz)   SpO2 100%   BMI 28.50 kg/m² O2 Device: None (Room air)   Temp (24hrs), Av.1 °F (36.7 °C), Min:97.9 °F (36.6 °C), Max:98.5 °F (36.9 °C)       Intake/Output:   Last shift:      701 - 1900  In: -   Out: 400 [Urine:400]    Last 3 shifts: 1901 -  07  In: 2200 [P.O.:480; I.V.:1720]  Out: 150 [Urine:150]      Intake/Output Summary (Last 24 hours) at 2022 1239  Last data filed at 2022 1000  Gross per 24 hour   Intake 1000 ml   Output 400 ml   Net 600 ml       Last 3 Recorded Weights in this Encounter    22 1611 22 193   Weight: 100.7 kg (222 lb 0.1 oz) 100.7 kg (222 lb 0.1 oz)       No results for input(s): PHI, PHI, POC2, PCO2I, PO2, PO2I, HCO3, HCO3I, FIO2, FIO2I in the last 72 hours.       Physical Exam:     General: AAO x 4, in no respiratory distress, cooperative, appears stated age, on room air  HEENT: PERRL, throat normal without erythema or exudate  Neck: No abnormally enlarged lymph nodes or thyroid, supple; RT IJ sheet in place - no bleeding at insertion site  Chest: normal  Lungs: moderate air entry, clear to auscultation bilaterally, normal percussion bilaterally, no tenderness/ rash  Heart: Regular rate and rhythm, S1S2 present, or without murmur or extra heart sounds  Abdomen: protuberant, bowel sounds normoactive, tympanic, abdomen is soft without significant tenderness, masses, organomegaly or guarding, rigidity, rebound  Extremity: 1+, 2+, pitting and BL LE edema, no cyanosis; peripheral pulses 2+ and symmetric  Neuro: alert, oriented x 3, no defects noted in general exam.  Skin: Skin color, texture, turgor unchanged       Data:       Recent Results (from the past 24 hour(s))   METABOLIC PANEL, COMPREHENSIVE    Collection Time: 02/15/22  1:45 PM   Result Value Ref Range    Sodium 128 (L) 136 - 145 mmol/L    Potassium 5.1 3.5 - 5.5 mmol/L    Chloride 98 (L) 100 - 111 mmol/L    CO2 22 21 - 32 mmol/L    Anion gap 8 3.0 - 18 mmol/L    Glucose 110 (H) 74 - 99 mg/dL    BUN 27 (H) 7.0 - 18 MG/DL    Creatinine 0.96 0.6 - 1.3 MG/DL    BUN/Creatinine ratio 28 (H) 12 - 20      GFR est AA >60 >60 ml/min/1.73m2    GFR est non-AA >60 >60 ml/min/1.73m2    Calcium 8.1 (L) 8.5 - 10.1 MG/DL    Bilirubin, total 3.4 (H) 0.2 - 1.0 MG/DL    ALT (SGPT) 44 16 - 61 U/L    AST (SGOT) 74 (H) 10 - 38 U/L    Alk.  phosphatase 143 (H) 45 - 117 U/L    Protein, total 4.9 (L) 6.4 - 8.2 g/dL    Albumin 2.6 (L) 3.4 - 5.0 g/dL    Globulin 2.3 2.0 - 4.0 g/dL    A-G Ratio 1.1 0.8 - 1.7     CBC W/O DIFF    Collection Time: 02/15/22  1:45 PM   Result Value Ref Range    WBC 6.4 4.6 - 13.2 K/uL    RBC 2.35 (L) 4.35 - 5.65 M/uL    HGB 8.8 (L) 13.0 - 16.0 g/dL    HCT 24.5 (L) 36.0 - 48.0 %    .3 (H) 78.0 - 100.0 FL    MCH 37.4 (H) 24.0 - 34.0 PG    MCHC 35.9 31.0 - 37.0 g/dL    RDW 14.6 (H) 11.6 - 14.5 %    PLATELET 70 (L) 974 - 420 K/uL    MPV 9.3 9.2 - 11.8 FL    NRBC 0.0 0  WBC    ABSOLUTE NRBC 0.00 0.00 - 0.01 K/uL   PROTHROMBIN TIME + INR    Collection Time: 02/15/22  1:45 PM   Result Value Ref Range    Prothrombin time 19.3 (H) 11.5 - 15.2 sec    INR 1.7 (H) 0.8 - 1.2     AMMONIA    Collection Time: 02/15/22  1:45 PM   Result Value Ref Range    Ammonia 71 (H) 11 - 32 UMOL/L   METABOLIC PANEL, BASIC    Collection Time: 02/16/22  4:45 AM   Result Value Ref Range    Sodium 130 (L) 136 - 145 mmol/L    Potassium 4.8 3.5 - 5.5 mmol/L    Chloride 99 (L) 100 - 111 mmol/L    CO2 20 (L) 21 - 32 mmol/L    Anion gap 11 3.0 - 18 mmol/L    Glucose 125 (H) 74 - 99 mg/dL    BUN 20 (H) 7.0 - 18 MG/DL    Creatinine 0.81 0.6 - 1.3 MG/DL    BUN/Creatinine ratio 25 (H) 12 - 20      GFR est AA >60 >60 ml/min/1.73m2    GFR est non-AA >60 >60 ml/min/1.73m2    Calcium 8.9 8.5 - 10.1 MG/DL   CBC WITH AUTOMATED DIFF    Collection Time: 02/16/22  4:45 AM   Result Value Ref Range    WBC 5.0 4.6 - 13.2 K/uL    RBC 2.53 (L) 4.35 - 5.65 M/uL    HGB 9.3 (L) 13.0 - 16.0 g/dL    HCT 26.4 (L) 36.0 - 48.0 %    .3 (H) 78.0 - 100.0 FL    MCH 36.8 (H) 24.0 - 34.0 PG    MCHC 35.2 31.0 - 37.0 g/dL    RDW 14.2 11.6 - 14.5 %    PLATELET 63 (L) 088 - 420 K/uL    MPV 9.4 9.2 - 11.8 FL    NRBC 0.0 0  WBC    ABSOLUTE NRBC 0.00 0.00 - 0.01 K/uL    NEUTROPHILS 83 (H) 40 - 73 %    LYMPHOCYTES 7 (L) 21 - 52 %    MONOCYTES 8 3 - 10 %    EOSINOPHILS 1 0 - 5 %    BASOPHILS 0 0 - 2 %    IMMATURE GRANULOCYTES 1 (H) 0.0 - 0.5 %    ABS. NEUTROPHILS 4.2 1.8 - 8.0 K/UL    ABS. LYMPHOCYTES 0.4 (L) 0.9 - 3.6 K/UL    ABS. MONOCYTES 0.4 0.05 - 1.2 K/UL    ABS. EOSINOPHILS 0.0 0.0 - 0.4 K/UL    ABS. BASOPHILS 0.0 0.0 - 0.1 K/UL    ABS. IMM. GRANS. 0.1 (H) 0.00 - 0.04 K/UL    DF AUTOMATED     AMMONIA    Collection Time: 02/16/22  4:45 AM   Result Value Ref Range    Ammonia 30 11 - 32 UMOL/L         Chemistry Recent Labs     02/16/22  0445 02/15/22  1345 02/15/22  0220 02/14/22  1727 02/14/22  1727   * 110* 144*   < > 204*   * 128* 126*   < > 124*   K 4.8 5.1 4.8   < > 5.8*   CL 99* 98* 94*   < > 94*   CO2 20* 22 23   < > 22   BUN 20* 27* 32*   < > 32*   CREA 0.81 0.96 1.23   < > 1.45*   CA 8.9 8.1* 8.6   < > 8.5   MG  --   --   --   --  2.1   AGAP 11 8 9   < > 8   BUCR 25* 28* 26*   < > 22*   AP  --  143*  --   --  172*   TP  --  4.9*  --   --  5.8*   ALB  --  2.6*  --   --  2.4*   GLOB  --  2.3  --   --  3.4   AGRAT  --  1.1  --   --  0.7*    < > = values in this interval not displayed. Lactic Acid Lactic acid   Date Value Ref Range Status   02/04/2022 2.4 (HH) 0.4 - 2.0 MMOL/L Final     Comment:     CALLED TO AND CORRECTLY REPEATED BY:  Nuha Floyd MD ER TO [facility] AT 1425 ON [id number]       No results for input(s): LAC in the last 72 hours.      Liver Enzymes Protein, total   Date Value Ref Range Status   02/15/2022 4.9 (L) 6.4 - 8.2 g/dL Final     Albumin   Date Value Ref Range Status   02/15/2022 2.6 (L) 3.4 - 5.0 g/dL Final     Globulin   Date Value Ref Range Status   02/15/2022 2.3 2.0 - 4.0 g/dL Final     A-G Ratio   Date Value Ref Range Status   02/15/2022 1.1 0.8 - 1.7   Final     Alk. phosphatase   Date Value Ref Range Status   02/15/2022 143 (H) 45 - 117 U/L Final     Recent Labs     02/15/22  1345 02/14/22  1727   TP 4.9* 5.8*   ALB 2.6* 2.4*   GLOB 2.3 3.4   AGRAT 1.1 0.7*   * 172*        CBC w/Diff Recent Labs     02/16/22  0445 02/15/22  1345 02/15/22  0220 02/14/22  1629 02/14/22  1629   WBC 5.0 6.4 6.4   < > 8.4   RBC 2.53* 2.35* 2.38*   < > 3.07*   HGB 9.3* 8.8* 9.0*   < > 11.3*   HCT 26.4* 24.5* 25.1*   < > 31.5*   PLT 63* 70* 73*   < > 103*   GRANS 83*  --  68  --  69   LYMPH 7*  --  11*  --  11*   EOS 1  --  5  --  5    < > = values in this interval not displayed. Cardiac Enzymes No results found for: CPK, CK, CKMMB, CKMB, RCK3, CKMBT, CKNDX, CKND1, DANNIELLE, TROPT, TROIQ, SAHIL, TROPT, TNIPOC, BNP, BNPP     BNP No results found for: BNP, BNPP, XBNPT     Coagulation Recent Labs     02/15/22  1345 02/15/22  0221 02/14/22  1410   PTP 19.3* 18.1* 16.4*   INR 1.7* 1.6* 1.4*   APTT  --   --  40.5*         Thyroid  Lab Results   Component Value Date/Time    TSH 1.94 02/15/2022 02:20 AM       No results found for: T4     Urinalysis Lab Results   Component Value Date/Time    Color DARK YELLOW 02/04/2022 01:45 PM    Appearance CLEAR 02/04/2022 01:45 PM    Specific gravity 1.023 02/04/2022 01:45 PM    pH (UA) 5.5 02/04/2022 01:45 PM    Protein Negative 02/04/2022 01:45 PM    Glucose 250 (A) 02/04/2022 01:45 PM    Ketone TRACE (A) 02/04/2022 01:45 PM    Bilirubin SMALL (A) 02/04/2022 01:45 PM    Urobilinogen 1.0 02/04/2022 01:45 PM    Nitrites Negative 02/04/2022 01:45 PM    Leukocyte Esterase Negative 02/04/2022 01:45 PM        Micro  No results for input(s): SDES, CULT in the last 72 hours. No results for input(s): CULT in the last 72 hours. Culture data during this hospitalization.    All Micro Results     None             US GUIDE PARACENTESIS    Result Date: 2/15/2022  PROCEDURE:  ULTRASOUND GUIDED THERAPEUTIC PARACENTESIS INDICATION: Symptomatic Ascites PERFORMED BY:  Yariel Herman PA-C ATTENDING PHYSICIAN:  Alessandra Lobo MD SUPERVISION: General TECHNIQUE & FINDINGS: Informed consent was obtained prior to the procedure. Standard pre-procedure time out taken at 0807 hours. Preliminary ultrasound of the abdomen shows a moderate amount of ascites, ultrasound image saved in PACS. A right lower quadrant approach was chosen. Sterile probe cover and gel were used. The skin was marked, prepped and draped in sterile fashion and 1% Lidocaine was used for local anesthesia. A 6 [place] Yueh sheathed needle was advanced into the peritoneal cavity, was connected to a vacuum, and a total of 3.3 liters of clear yellow fluid was removed. A specimen was collected and taken to the lab, as requested. The patient tolerated the procedure well and there were no immediate complications. GUIDANCE: Ultrasound guidance was used to position (and confirm the position of) the Yueh sheathed needle. Image(s) saved in PACS: Ultrasound     Successful ultrasound guided paracentesis of approximately 3.3 liters of clear yellow fluid. ECHO ADULT COMPLETE    Result Date: 2/14/2022    Left Ventricle: Left ventricle is mildly dilated. Normal wall thickness. See diagram for wall motion findings. Normal left ventricular systolic function with a visually estimated EF of 60 - 65%. Grade I diastolic dysfunction with normal LAP.   Left Atrium: Left atrium is mildly dilated.   Right Ventricle: Right ventricle is mildly dilated. Hyperdynamic systolic function.   Right Atrium: Right atrium is mildly dilated.   Mitral Valve: Mildly thickened anterior leaflet. Moderately calcified anterior leaflet. Mild mitral annular calcification. Mildly thickened subvalvular apparatus. Mild transvalvular regurgitation.   Pulmonary artery : Estimated pulmonary arterial systolic pressure is 40 mmhg. Pulmonary hypertension found to be mild.    Interatrial septum : No interatrial shunt visualized on color Doppler. The septum is bowing into the LA     US DPLX ABD VISC A/V LTD W CONT    Result Date: 2/15/2022  EXAM: ULTRASOUND DUPLEX ABDOMINAL/LIVER VASCULATURE INDICATION: Pre-TIPS evaluation. Cirrhosis with refractory ascites. COMPARISON: No prior studies TECHNIQUE: Multiple gray scale sonographic images of the hepatic vasculature were obtained. Grayscale, color flow Doppler imaging, and velocity spectral waveform analysis of the hepatic vessels was performed (duplex imaging). Contrast examination with Selena Sins microbubbles evaluation performed. ____________________ FINDINGS: Main portal vein is patent with appropriate antegrade/hepatopetal  blood flow demonstrated with color and spectral Doppler imaging. Left and right portal veins demonstrate hepatopetal flow. No portal vein thrombus is identified. Portal vein waveforms are not pulsatile. Diameter of main portal vein is 15 mm. Estimated velocity of main portal vein flow is 27.4 cm/s. Estimated velocity of left portal vein is 19.8 cm/s. Estimated velocity right portal vein is 21.9 cm/s. Hepatic arteries identified with normal antegrade arterial waveforms demonstrated with color and spectral Doppler imaging. Estimated hepatic artery resistive index is 0.86. Normal hepatofugal flow dynamics seen in the hepatic veins with normal flow in the inferior vena cava, demonstrated with color and spectral Doppler imaging. Also noted is nodular echogenic hepatic parenchyma correlating with known cirrhosis. Moderate ascites. ____________________     1. Patent portal veins without evidence of thrombosis. Hepatopedal flow. 2. Patent hepatic veins. 3. Cirrhotic appearing echogenic liver. Perihepatic ascites. Images report reviewed by me:  CT (Most Recent) (CT chest reviewed by me) No results found for this or any previous visit. CXR reviewed by me:  XR (Most Recent).  CXR  reviewed by me and compared with previous CXR Results from [facility] [place] [place] Encounter encounter on 02/14/22    XR CHEST PORT    Narrative  EXAM: XR CHEST PORT    CLINICAL INDICATION/HISTORY: TIPS  -Additional: None    COMPARISON: None    TECHNIQUE: Portable frontal view of the chest    _______________    FINDINGS:    SUPPORT DEVICES: Right IJ vascular sheath within the mid SVC. HEART AND MEDIASTINUM: Cardiomediastinal silhouette within normal limits. LUNGS AND PLEURAL SPACES: No dense consolidation, large effusion or  pneumothorax.    _______________    Impression  No acute cardiopulmonary abnormality. ·Please note: Voice-recognition software may have been used to generate this report, which may have resulted in some phonetic-based errors in grammar and contents. Even though attempts were made to correct all the mistakes, some may have been missed, and remained in the body of the document.       Maycol Borjas MD  2/16/2022

## 2022-02-16 NOTE — PROGRESS NOTES
Patient discharge to home. Patient vitals remain stable and patient is afebrile. Discharge instructions and prescriptions reviewed with patient and spouse. Patient verbalizes understanding. Time allotted for questions. PIV removed. Discharge instructions given to the patient. Patient transported [facility] via [facility].

## 2022-02-16 NOTE — PROGRESS NOTES
Problem: Falls - Risk of  Goal: *Absence of Falls  Description: Document Clearence Skates Fall Risk and appropriate interventions in the flowsheet.   Outcome: Progressing Towards Goal  Note: Fall Risk Interventions:            Medication Interventions: Bed/chair exit alarm,Patient to call before getting OOB                   Problem: Patient Education: Go to Patient Education Activity  Goal: Patient/Family Education  Outcome: Progressing Towards Goal     Problem: Ascities-Palliative Care  Goal: *PALLIATIVE CARE-Alleviation of ascities  Outcome: Progressing Towards Goal     Problem: Patient Education: Go to Patient Education Activity  Goal: Patient/Family Education  Outcome: Progressing Towards Goal     Problem: Afib Pathway: Day 1  Goal: Off Pathway (Use only if patient is Off Pathway)  Outcome: Progressing Towards Goal  Goal: Activity/Safety  Outcome: Progressing Towards Goal  Goal: Consults, if ordered  Outcome: Progressing Towards Goal  Goal: Diagnostic Test/Procedures  Outcome: Progressing Towards Goal  Goal: Nutrition/Diet  Outcome: Progressing Towards Goal  Goal: Discharge Planning  Outcome: Progressing Towards Goal  Goal: Medications  Outcome: Progressing Towards Goal  Goal: Respiratory  Outcome: Progressing Towards Goal  Goal: Treatments/Interventions/Procedures  Outcome: Progressing Towards Goal  Goal: Psychosocial  Outcome: Progressing Towards Goal  Goal: *Optimal pain control at patient's stated goal  Outcome: Progressing Towards Goal  Goal: *Hemodynamically stable  Outcome: Progressing Towards Goal  Goal: *Stable cardiac rhythm  Outcome: Progressing Towards Goal  Goal: *Lungs clear or at baseline  Outcome: Progressing Towards Goal  Goal: *Labs within defined limits  Outcome: Progressing Towards Goal  Goal: *Describes available resources and support systems  Outcome: Progressing Towards Goal     Problem: Afib Pathway: Day 2  Goal: Off Pathway (Use only if patient is Off Pathway)  Outcome: Progressing Towards Goal  Goal: Activity/Safety  Outcome: Progressing Towards Goal  Goal: Consults, if ordered  Outcome: Progressing Towards Goal  Goal: Diagnostic Test/Procedures  Outcome: Progressing Towards Goal  Goal: Nutrition/Diet  Outcome: Progressing Towards Goal  Goal: Discharge Planning  Outcome: Progressing Towards Goal  Goal: Medications  Outcome: Progressing Towards Goal  Goal: Respiratory  Outcome: Progressing Towards Goal  Goal: Treatments/Interventions/Procedures  Outcome: Progressing Towards Goal  Goal: Psychosocial  Outcome: Progressing Towards Goal  Goal: *Hemodynamically stable  Outcome: Progressing Towards Goal  Goal: *Optimal pain control at patient's stated goal  Outcome: Progressing Towards Goal  Goal: *Stable cardiac rhythm  Outcome: Progressing Towards Goal  Goal: *Lungs clear or at baseline  Outcome: Progressing Towards Goal  Goal: *Describes available resources and support systems  Outcome: Progressing Towards Goal     Problem: Afib Pathway: Day 3  Goal: Off Pathway (Use only if patient is Off Pathway)  Outcome: Progressing Towards Goal  Goal: Activity/Safety  Outcome: Progressing Towards Goal  Goal: Diagnostic Test/Procedures  Outcome: Progressing Towards Goal  Goal: Nutrition/Diet  Outcome: Progressing Towards Goal  Goal: Discharge Planning  Outcome: Progressing Towards Goal  Goal: Medications  Outcome: Progressing Towards Goal  Goal: Respiratory  Outcome: Progressing Towards Goal  Goal: Treatments/Interventions/Procedures  Outcome: Progressing Towards Goal  Goal: Psychosocial  Outcome: Progressing Towards Goal     Problem: Afib: Discharge Outcomes (not in CAT)  Goal: *Hemodynamically stable  Outcome: Progressing Towards Goal  Goal: *Stable cardiac rhythm  Outcome: Progressing Towards Goal  Goal: *Lungs clear or at baseline  Outcome: Progressing Towards Goal  Goal: *Optimal pain control at patient's stated goal  Outcome: Progressing Towards Goal  Goal: *Identifies cardiac risk factors  Outcome: Progressing Towards Goal  Goal: *Verbalizes home exercise program, activity guidelines, cardiac precautions  Outcome: Progressing Towards Goal  Goal: *Verbalizes understanding and describes prescribed diet  Outcome: Progressing Towards Goal  Goal: *Verbalizes understanding and describes medication purposes and frequencies  Outcome: Progressing Towards Goal  Goal: *Anxiety reduced or absent  Outcome: Progressing Towards Goal  Goal: *Understands and describes signs and symptoms to report to providers(Stroke Metric)  Outcome: Progressing Towards Goal  Goal: *Describes follow-up/return visits to physicians  Outcome: Progressing Towards Goal  Goal: *Describes available resources and support systems  Outcome: Progressing Towards Goal  Goal: *Influenza immunization  Outcome: Progressing Towards Goal  Goal: *Pneumococcal immunization  Outcome: Progressing Towards Goal  Goal: *Describes smoking cessation resources  Outcome: Progressing Towards Goal     Problem: Patient Education: Go to Patient Education Activity  Goal: Patient/Family Education  Outcome: Progressing Towards Goal

## 2022-02-16 NOTE — PROGRESS NOTES
POST TIPS PROGRESS NOTE:    Assessment:   Satisfactory post TIPS insertion course. Plan:   Plan per primary team.  Will remove right IJ sheath this morning. Subjective:  Doing well post-TIPS  Denies abdominal pain, chest pain, shortness of breath, nausea, vomiting, or active bleeding. Objective:  Visit Vitals  /64   Pulse 76   Temp 98.3 °F (36.8 °C)   Resp 13   Ht 6' 2\" (1.88 m)   Wt 100.7 kg (222 lb 0.1 oz)   SpO2 100%   BMI 28.50 kg/m²     Abdomen: soft, nontender  Right neck sheath site is clean, dry, and intact. No palpable hematoma. No significant change in H&H. Vitals stable overnight.       Wally Hoff PA-C  Vascular & Interventional Radiology  [facility]    2/16/2022

## 2022-02-16 NOTE — ANESTHESIA POSTPROCEDURE EVALUATION
* No procedures listed *.    general    Anesthesia Post Evaluation      Multimodal analgesia: multimodal analgesia not used between 6 hours prior to anesthesia start to PACU discharge  Patient location during evaluation: PACU  Patient participation: complete - patient participated  Level of consciousness: awake and alert  Pain score: 0  Airway patency: patent  Anesthetic complications: no  Cardiovascular status: acceptable  Respiratory status: acceptable  Hydration status: acceptable  Post anesthesia nausea and vomiting:  none  Final Post Anesthesia Temperature Assessment:  Normothermia (36.0-37.5 degrees C)      INITIAL Post-op Vital signs:   Vitals Value Taken Time   /56 02/16/22 1300   Temp     Pulse 87 02/16/22 1311   Resp 14 02/16/22 1311   SpO2 100 % 02/16/22 1311   Vitals shown include unvalidated device data.

## 2022-02-16 NOTE — PROGRESS NOTES
1316 - Sheath removed from Rt IJ. Hemostasis achieved with manual pressure. Gauze and tegaderm on puncture site. Pt sitting up with no complaints.

## 2022-02-16 NOTE — PROGRESS NOTES
Nephrology Progress Note    Patient: Kia Keene  Requesting physician: Dr. Mayr Irwin  Reason for consult: Hyponatremia    Subjectives:  Kia Keene is a 61 y.o. male with a past medical history significant for afib, HTN, cirrhosis following hepatology Dr Chaka Valderrama, who presented with weakness, found to have K of 6.3, Na 124. Pt has been having recurrent ascites required paracentesis up to 2 times weekly. Pt was then admitted to [facility] [place], started on medical treatment for hyperkalemia. His Nephrology was consulted for further evaluation and management of his hyponatremia. Of note, recent serum Na was in the low 120's. S/p TIPS, feeling better this AM.  Na 130 now.     Past Medical History:  Patient Active Problem List   Diagnosis Code    Alcoholic liver disease ([facility] [address] K70.9    Cirrhosis ([facility] [address] K74.60    Ascites R18.8    Esophageal varices (HCC) I85.00    Psoriasis L40.9    Paroxysmal atrial fibrillation (HCC) I48.0    Alcohol abuse, in remission F10.11    Hyponatremia E87.1    Acid reflux K21.9    Hyperkalemia E87.5       Social History:  Social History     Socioeconomic History    Marital status:    Tobacco Use    Smoking status: Never Smoker    Smokeless tobacco: Never Used   Substance and Sexual Activity    Alcohol use: Not Currently    Drug use: Never       Allergy:  No Known Allergies     Medication:  Home meds: reviewed    Inpatient meds:  Current Facility-Administered Medications   Medication Dose Route Frequency    furosemide (LASIX) tablet 20 mg  20 mg Oral DAILY    spironolactone (ALDACTONE) tablet 50 mg  50 mg Oral DAILY    lactulose (CHRONULAC) 10 gram/15 mL solution 15 mL  10 g Oral TID    iopamidoL (ISOVUE 300) 61 % contrast injection 1-100 mL  1-100 mL IntraCATHeter RAD CONTINUOUS    albumin human 25% (BUMINATE) solution 25 g  25 g IntraVENous Q6H    sodium chloride (NS) flush 5-40 mL  5-40 mL IntraVENous Q8H    sodium chloride (NS) flush 5-40 mL  5-40 mL IntraVENous PRN    acetaminophen (TYLENOL) tablet 650 mg  650 mg Oral Q6H PRN    Or    acetaminophen (TYLENOL) suppository 650 mg  650 mg Rectal Q6H PRN    bisacodyL (DULCOLAX) suppository 10 mg  10 mg Rectal DAILY PRN    promethazine (PHENERGAN) tablet 12.5 mg  12.5 mg Oral Q6H PRN    pantoprazole (PROTONIX) tablet 40 mg  40 mg Oral DAILY    flecainide (TAMBOCOR) tablet 50 mg  50 mg Oral Q12H    0.9% sodium chloride infusion  50 mL/hr IntraVENous CONTINUOUS     Vital signs:   Visit Vitals  /64   Pulse 76   Temp 98.3 °F (36.8 °C)   Resp 13   Ht 6' 2\" (1.88 m)   Wt 100.7 kg (222 lb 0.1 oz)   SpO2 100%   BMI 28.50 kg/m²       Intake/Output Summary (Last 24 hours) at 2/16/2022 9214  Last data filed at 2/16/2022 0009  Gross per 24 hour   Intake 1720 ml   Output    Net 1720 ml       Physical Exam:    General: No acute distress   HENT: Atraumatic and normocephalic   Eyes: Normal conjunctiva, EOMI   Neck: Supple with no mass   Cardiovascular: Normal S1 & S2, no m/r/g   Pulmonary/Chest Wall: Clear to auscultation bilaterally, no w/c   Abdominal: Soft and non-tender, normal active bowel sound, +distension   Musculoskeletal: edema lower ext bilaterraly   Skin: No lesions or rash   Neurological: No focal neurological deficits       Data Review:  CMP:   Lab Results   Component Value Date/Time     (L) 02/16/2022 04:45 AM    K 4.8 02/16/2022 04:45 AM    CL 99 (L) 02/16/2022 04:45 AM    CO2 20 (L) 02/16/2022 04:45 AM    AGAP 11 02/16/2022 04:45 AM     (H) 02/16/2022 04:45 AM    BUN 20 (H) 02/16/2022 04:45 AM    CREA 0.81 02/16/2022 04:45 AM    GFRAA >60 02/16/2022 04:45 AM    GFRNA >60 02/16/2022 04:45 AM    CA 8.9 02/16/2022 04:45 AM    ALB 2.6 (L) 02/15/2022 01:45 PM    TP 4.9 (L) 02/15/2022 01:45 PM    GLOB 2.3 02/15/2022 01:45 PM    AGRAT 1.1 02/15/2022 01:45 PM    ALT 44 02/15/2022 01:45 PM     CBC:   Lab Results   Component Value Date/Time    WBC 5.0 02/16/2022 04:45 AM    HGB 9.3 (L) 02/16/2022 04:45 AM    HCT 26.4 (L) 02/16/2022 04:45 AM    PLT 63 (L) 02/16/2022 04:45 AM     All Cardiac Markers in the last 24 hours:   No results found for: CPK, CK, CKMMB, CKMB, RCK3, CKMBT, CKNDX, CKND1, DANNIELLE, TROPT, TROIQ, SAHIL, TROPT, TNIPOC, BNP, BNPP  Recent Glucose Results:   Lab Results   Component Value Date/Time     (H) 02/16/2022 04:45 AM     (H) 02/15/2022 01:45 PM     ABG: No results found for: PH, PHI, PCO2, PCO2I, PO2, PO2I, HCO3, HCO3I, FIO2, FIO2I  COAGS:   Lab Results   Component Value Date/Time    PTP 19.3 (H) 02/15/2022 01:45 PM    INR 1.7 (H) 02/15/2022 01:45 PM     Liver Panel:   Lab Results   Component Value Date/Time    ALB 2.6 (L) 02/15/2022 01:45 PM    TP 4.9 (L) 02/15/2022 01:45 PM    GLOB 2.3 02/15/2022 01:45 PM    AGRAT 1.1 02/15/2022 01:45 PM    ALT 44 02/15/2022 01:45 PM     (H) 02/15/2022 01:45 PM       Paracentesis 2/15/22:    Informed consent was obtained prior to the procedure. Standard pre-procedure  time out taken at 0807 hours. Preliminary ultrasound of the abdomen shows a  moderate amount of ascites, ultrasound image saved in PACS. A right lower  quadrant approach was chosen. Sterile probe cover and gel were used. The skin  was marked, prepped and draped in sterile fashion and 1% Lidocaine was used for  local anesthesia. A 6 [employer] Yueh sheathed needle was advanced into the  peritoneal cavity, was connected to a vacuum, and a total of 3.3 liters of clear  yellow fluid was removed. A specimen was collected and taken to the lab, as  requested. The patient tolerated the procedure well and there were no immediate  complications.     GUIDANCE: Ultrasound guidance was used to position (and confirm the position of)  the Yueh sheathed needle. Image(s) saved in PACS: Ultrasound       Kidney ultrasound:    None    Impression:     1. Hyponatremia, chronic, in the setting of cirrhosis and refractory ascites. Diuretics placed on hold and Na has been improving on gentle NS.   Na 130 this AM.  2. Hyperkalemia, treated and resolved. 3. Cirrhosis  4. Refractory ascites s/p TIPS  5.  Anemia    Plan:     Has normal TSH  Cont gentle IV NS  Goal Na correction 6-8 mmol/L per 24hrs  Hepatology following  D/w pt and wife    Mike Mccormick MD  [facility]  [phone]

## 2022-02-16 NOTE — ADT AUTH CERT NOTES
Systemic or Infectious Condition GRG - Care Day 3 (2/16/2022) by Ronnell Peabody, RN       Review Entered Review Status   2/16/2022 13:48 Completed      Criteria Review      Care Day: 3 Care Date: 2/16/2022 Level of Care: Telemetry    Guideline Day 2    Level Of Care    (X) Floor    2/16/2022 13:48:56 EST by [place]      tele    Clinical Status    (X) * No ICU or intermediate care needs    2/16/2022 13:48:56 EST by [place]      tele    Interventions    (X) Inpatient interventions continue    2/16/2022 13:48:56 EST by Nicky Evans regular diet, incentive sprometry, scd, io, cardiac monitoring    * Milestone   Additional Notes   2/16/22      POST TIPS PROGRESS NOTE:       Assessment:    Satisfactory post TIPS insertion course.           Plan:    Plan per primary team.   Will remove right IJ sheath this morning.           Subjective:   Doing well post-TIPS   Denies abdominal pain, chest pain, shortness of breath, nausea, vomiting, or active bleeding.           Objective:   Visit Vitals      /64   Pulse 76   Temp 98.3 °F (36.8 °C)   Resp 13   Ht 6' 2\" (1.88 m)   Wt 100.7 kg (222 lb 0.1 oz)   SpO2 100% RA   BMI 28.50 kg/m²       Abdomen: soft, nontender   Right neck sheath site is clean, dry, and intact.  No palpable hematoma. No significant change in H&H. Vitals stable overnight.           Belen Dunne PA-C   Vascular & Interventional Radiology   [facility]       2/16/2022               PULMONARY:   RECOMMENDATIONS:       Respiratory: stable respiratory status, on room air, SPO2 100% on monitor at bedside   Protecting airway   Chest x-ray 2/16/22: No acute cardiopulmonary abnormality   Keep SPO2 >=92%. HOB 30 degree elevation all the time. Aggressive pulmonary toileting. Aspiration precautions.  Incentive spirometry.       CVS: Hemodynamically stable       ID: No clinical evidence for sepsis       Hematology/Oncology: No evidence of active bleeding anywhere   Stable Hgb and Plt   Monitor CBC   Monitor INR per hepatology       Renal: nephrology following   Correct S. Na+ as needed per nephrology   Chronic hyponatremia likely related to chronic liver disease       GI/: Diet as tolerated   Continue lactulose 3 times a day   No evidence for encephalopathy post-TIPS   Lasix and Spironolactone per hepatology   PPI for GI prophylaxis       Endocrine: Monitor FSBS as needed for any hypoglycemia       Neurology: Monitor mentation for any development of hepatic encephalopathy post-TIPS   AAO x 4. Nonfocal exam       Toxicology: Reports quit EtOH more than 1 year ago       Pain/Sedation: Avoid any sedative, opiate, hypnotic       Skin/Wound: As per nursing care       Electrolytes: Replace electrolytes per ICU electrolyte replacement protocol.       IVF: NS at 50 mL an hour per nephrology       Nutrition: Diet as tolerated       Prophylaxis: DVT Prophylaxis: SCD. GI Prophylaxis: PPI.        Restraints: none   PT/OT following   Lines/Tubes: PIV, RT IJ sheet - IR aware to remove today   Pt does not have Persaud       Advance Directive/Palliative Care: Consult per clinical course.       Transfer to tele with possible DC plan later today   Will defer respective systems problem management to primary and other respective consultant and sign off once out of ICU. Quality Care: PPI, DVT prophylaxis, HOB elevated, Infection control all reviewed and addressed.          BP (!) 124/56   Pulse 85   Temp 98.5 °F (36.9 °C)   Resp 24   Ht 6' 2\" (1.88 m)   Wt 100.7 kg (222 lb 0.1 oz)   SpO2 100%   BMI 28.50 kg/m²             Current Facility-Administered Medications:    ·  furosemide (LASIX) tablet 20 mg, 20 mg, Oral, DAILY, Julian Ruggiero MD, 20 mg at 02/16/22 1389   ·  spironolactone (ALDACTONE) tablet 50 mg, 50 mg, Oral, DAILY, Julian Ruggiero MD, 50 mg at 02/16/22 0812   ·  lactulose (CHRONULAC) 10 gram/15 mL solution 15 mL, 10 g, Oral, TID, Fallon Victoria MD, 15 mL at 02/16/22 0809   ·  iopamidoL (ISOVUE 300) 61 % contrast injection 1-100 mL, 1-100 mL, IntraCATHeter, RAD CONTINUOUS, [place], Reg Geiger MD, 145 mL at 02/15/22 1334   ·  albumin human 25% (BUMINATE) solution 25 g, 25 g, IntraVENous, Q6H, Truman Gamino MD, 25 g at 02/16/22 1134   ·  sodium chloride (NS) flush 5-40 mL, 5-40 mL, IntraVENous, Q8H, Truman Gamino MD, 10 mL at 02/16/22 1321   ·  sodium chloride (NS) flush 5-40 mL, 5-40 mL, IntraVENous, PRN, Truman Gamino MD   ·  acetaminophen (TYLENOL) tablet 650 mg, 650 mg, Oral, Q6H PRN **OR** acetaminophen (TYLENOL) suppository 650 mg, 650 mg, Rectal, Q6H PRN, Truman Gamino MD   ·  bisacodyL (DULCOLAX) suppository 10 mg, 10 mg, Rectal, DAILY PRN, Truman Gamino MD   ·  promethazine (PHENERGAN) tablet 12.5 mg, 12.5 mg, Oral, Q6H PRN **OR** [DISCONTINUED] ondansetron (ZOFRAN) injection 4 mg, 4 mg, IntraVENous, Q6H PRN, Truman Gamino MD   ·  pantoprazole (PROTONIX) tablet 40 mg, 40 mg, Oral, DAILY, Truman Gamino MD, 40 mg at 02/16/22 0809   ·  flecainide (TAMBOCOR) tablet 50 mg, 50 mg, Oral, Q12H, Truman Gamino MD, 50 mg at 02/16/22 0810   ·  0.9% sodium chloride infusion, 50 mL/hr, IntraVENous, CONTINUOUS, Truman Gamino MD, Stopped at 02/16/22 1322         HEPATOLOGY:   ASSESSMENT AND PLAN:   Cirrhosis   The diagnosis of cirrhosis is based upon imaging, laboratory studies, complications of cirrhosis. Cirrhosis is secondary to alcohol.      The CTP is 9.  Child class B.  The MELD score is 22.       Alcohol liver disease   The diagnosis is based upon a history of consuming significant alcohol on a daily basis for many years, liver biopsy, pattern of AST>ALT, an elevation in ferritin and FE saturation, serology that is negative for other causes of chronic liver disease.  A liver biopsy performed in 3/2018 shows fatty liver consistent with alcohol etiology and Cirrhosis.     The patient has been abstinent from alcohol since 3/2021.       Elevation in Ferritin   There is an elevation in ferritin with Elevated iron saturation.     It is unlikely that the patient has hemochromatosis or is a carrier for an HFE gene. HFE genetic testing was negative   The elevation in ferritin is secondary to alcohol use and will come down to normal with abstinence.       Ascites    Ascites developed for the first time in 3/2021. Ascites is refractory to current doses of diuretics because of hyponatremia. Paracentesis is being performed every 1week.       Pre-TIPS ECHO showed no elevation in PAP and no TR    He underwent placement of TIPS yesterday.       Lower extremity edema   Edema is persistent despite current dose of diuretics.     Will continue the current dose of diuretics at step 2.       Screening for Esophageal varices    The patient has small esophageal varices without prior bleeding.     The patient has now had a TIPS placed. This will adequately decompress portal hypertension and esophageal varices if present prior to TIPS will resolve.     EGD to screen for esophageal varices is no longer necessary.       Hepatic encephalopathy    Overt HE has not developed to date.     There is no reason for treatment with lactulose of xifaxan   Sammonia is normal this AM       Anemia    This is due to multifactorial causes including portal hypertension with chronic GI blood loss, bone marrow suppression secondary to previous alcohol. The most recent FE studies were from 12/2021. The serum ferritin is 507   The FE saturation is 51       Thrombocytopenia    This is secondary to cirrhosis. There is no evidence of overt bleeding.     No treatment is required. The platelet count is adequate for the patient to undergo procedures without the need for platelet transfusion or platelet growth factors. 2/16/2022 04:45   RBC: 2.53 (L)   HGB: 9.3 (L)   HCT: 26.4 (L)   MCV: 104.3 (H)   MCH: 36.8 (H)   PLATELET: 63 (L)   NEUTROPHILS: 83 (H)   LYMPHOCYTES: 7 (L)   IMMATURE GRANULOCYTES: 1 (H)   ABS. IMM. GRANS.: 0.1 (H)   ABS.  LYMPHOCYTES: 0.4 (L)   Sodium: 130 (L)   Chloride: 99 (L)   CO2: 20 (L)   Glucose: 125 (H)   BUN: 20 (H)   BUN/Creatinine ratio: 25 (H)         CM NOTES:   CM notes patient has order to transfer to 3N and has order to remove right IJ sheath this morning. Patient is s/p TIPS procedure. CM to watch for recommendations from therapy to assist with identifying any transition of care needs. CM to continue to monitor and remain available.        CM expects patient may discharge in the next 24 hours. PT note shows that recommendation is TBD depending upon further evaluation. CM spoke with nursing to ask PT/OT to assess patient when appropriate after removal of sheath and prior to discharge to assist in identifying any transition of care needs.  Cm anticipates patient will discharge within the next 24 hours.         Systemic or Infectious Condition GRG - Care Day 2 (2/15/2022) by Janell Laws RN       Review Entered Review Status   2/16/2022 13:43 Completed      Criteria Review      Care Day: 2 Care Date: 2/15/2022 Level of Care: ICU    Guideline Day 2    Level Of Care    ( ) Floor    2/16/2022 13:42:22 EST by Rajesh Judge      ICU    Clinical Status    ( ) * No ICU or intermediate care needs    Interventions    (X) Inpatient interventions continue    2/16/2022 13:43:05 EST by Rajesh Judge      incentive spiormetry, scd, acivity as tolerated with assist, cardiac monitoring, reguar diet    * Milestone   Additional Notes   2/15/22      PULMONARY:   IMPRESSION:       · Portal HTN, s/p TIPS - K76.6   ·     · Active Hospital Problems     Diagnosis Date Noted   · Hyperkalemia 02/14/2022   · Hyponatremia 01/23/2022   · Acid reflux     · Alcohol abuse, in remission 12/13/2021   · Ascites 12/13/2021   · Cirrhosis ([address] 12/13/2021   · Esophageal varices ([address] 12/13/2021   · Paroxysmal atrial fibrillation ([address] 69/75/4968   · Alcoholic liver disease ([address] 12/13/2021   ·    · Patient Active Problem List   · Diagnosis · Code   · · · Alcoholic liver disease ([address] · K70.9   · · · Cirrhosis (HCC) · K74.60   · · · Ascites · R18.8   · · · Esophageal varices (HCC) · I85.00   · · · Psoriasis · L40.9   · · · Paroxysmal atrial fibrillation (HCC) · I48.0   · · · Alcohol abuse, in remission · F10.11   · · · Hyponatremia · E87.1   · · · Acid reflux · K21.9   · · · Hyperkalemia · E87.5   · Code status: Full Code        RECOMMENDATIONS:       Respiratory: Compensated respiratory status, on O2 via nasal cannula at 2 L/min   No acute issues. Protecting airway   Chest x-ray ordered for a.m. Monitor for any signs or symptoms suggestive of noncardiogenic pulmonary edema post TIPS   Keep SPO2 >=92%. HOB 30 degree elevation all the time. Aggressive pulmonary toileting. Aspiration precautions. Incentive spirometry.       CVS: Hemodynamically stable       ID: No clinical evidence for sepsis       Hematology/Oncology: No evidence of active bleeding anywhere   Monitor CBC for hemoglobin and platelet   Monitor INR per hepatology       Renal: Await nephrology input   Correct S. Na+ as needed per nephrology   Chronic hyponatremia likely related to chronic liver disease       GI/: Diet as tolerated   LFTs are expected to increase post TIPS   Continue lactulose 3 times a day   Monitor for any worsening mentation related to hepatic encephalopathy from increasing ammonia post TIPS   PPI for GI prophylaxis       Endocrine: Monitor FSBS as needed for any hypoglycemia       Neurology: Monitor mentation for any development of hepatic encephalopathy post TIPS   AAO x 4. Nonfocal exam       Toxicology: Reports quit EtOH more than 1 year ago       Pain/Sedation: Avoid any sedative, opiate, hypnotic       Skin/Wound: As per nursing care       Electrolytes: Replace electrolytes per ICU electrolyte replacement protocol.       IVF: NS at 50 mL an hour       Nutrition: Diet as tolerated       Prophylaxis: DVT Prophylaxis: SCD.  GI Prophylaxis: PPI.        Restraints: none       Lines/Tubes: PIV, RT IJ sheet   Pt does not have Persaud             INTERNAL MEDICINE:   Hyperkalemia-resolved    Received Lasix, Kayexalate, calcium gluconate, insulin/d50 on admission    Monitor potassium   Repeated 4.8 am            Hyponatremia, chronic    hold diuretics, normal saline infusion low rate   Nephrologist called    Improving 123            Alcoholic liver disease   In remission        Cirrhosis, varices    Continue  albumin   Due to alcoholic liver disease    Dr. Cony Sandoval f/u                A. Fib, chronic ac    Continue home medication, balance electrolytes   On eliquis at home-hold for procedure        Thrombocytopenia   Due to cirrhosis   Continue monitoring           Ascites   Paracentesis done -TIPS today    Will transfer to icu after the procedure -discussed with Dr. Hare Diss            Reflux disease    ppi         ammonia level up-a little bit confused-wife reported \"slow \" put low dose lactulose              Visit Vitals   BP (!) 103/47   Pulse 86   Temp 98.1 °F (36.7 °C)   Resp 16   Ht 6' 2\" (1.88 m)   Wt 100.7 kg (222 lb 0.1 oz)   SpO2 100% RA   BMI 28.50 kg/m²      Physical Exam:   General:         WD, WN.  Alert, cooperative, no acute distress     HEENT:           NC, Atraumatic.  PERRLA, anicteric sclerae. Lungs:            CTA Bilaterally. No Wheezing/Rhonchi/Rales. Heart:              Regular  rhythm,  No murmur, No Rubs, No Gallops   Abdomen:      Soft, +distended, Non tender.  +Bowel sounds,    Extremities:   No c/c/e   Psych:              Not anxious or agitated.    Neurologic:     No acute neurological deficit, but slow             Interventional Radiology Brief Procedure Note       Performed K[facility] I[place]       Supervising Radiologist: Yue Saleh MD       Pre-operative Diagnosis:  Symptomatic Ascites       Post-operative Diagnosis: Same as pre-op diagnosis       Procedure(s) Performed: US Guided Paracentesis       Anesthesia:  Local Anesthesia       Findings:  [address] and RLQ approach chosen.  3.3 liters of clear yellow fluid was removed.  Please see dictated report for details.       Complications: None immediate       Estimated Blood Loss:  Minimal       Tubes and Drains: None       Specimens: Sent for cell count, as requested       Condition: Good       Plan: Return to nursing unit.  Plan per primary team.           Xi Lopez PA-C   [facility]   Vascular & Interventional Radiology   2/15/2022          NEPHROLOGY:   Impression:       1. Hyponatremia, chronic, in the setting of cirrhosis and refractory ascites.  Diuretics placed on hold and Na has been improving on gentle NS   2. Hyperkalemia, treated   3. Cirrhosis   4. Refractory ascites   5. Anemia       Plan:       Check urine Na and osm   Check serum osm and TSH   Cont gentle IV NS   Goal Na correction 6-8 mmol/L per 24hrs   Monitor serum Na every 4hrs   Pending TIPS per team             MEDS:   · albumin human 25% (BUMINATE) solution 25 g 25 g IntraVENous Q6H   · sodium chloride (NS) flush 5-40 mL 5-40 mL IntraVENous Q8H   · sodium chloride (NS) flush 5-40 mL 5-40 mL IntraVENous PRN   · acetaminophen (TYLENOL) tablet 650 mg 650 mg Oral Q6H PRN     Or   · acetaminophen (TYLENOL) suppository 650 mg 650 mg Rectal Q6H PRN   · bisacodyL (DULCOLAX) suppository 10 mg 10 mg Rectal DAILY PRN   · promethazine (PHENERGAN) tablet 12.5 mg 12.5 mg Oral Q6H PRN   · pantoprazole (PROTONIX) tablet 40 mg 40 mg Oral DAILY   · flecainide (TAMBOCOR) tablet 50 mg 50 mg Oral Q12H   · 0.9% sodium chloride infusion 50 mL/hr IntraVENous CONTINUOUS             2/15/2022 02:20   RBC: 2.38 (L)   HGB: 9.0 (L)   HCT: 25.1 (L)   MCV: 105.5 (H)   MCH: 37.8 (H)   PLATELET: 73 (L)   LYMPHOCYTES: 11 (L)   MONOCYTES: 14 (H)   IMMATURE GRANULOCYTES: 1 (H)   ABS. IMM. GRANS.: 0.1 (H)   ABS.  LYMPHOCYTES: 0.7 (L)         2/15/2022 13:45   RBC: 2.35 (L)   HGB: 8.8 (L)   HCT: 24.5 (L)   MCV: 104.3 (H)   MCH: 37.4 (H)   RDW: 14.6 (H)   PLATELET: 70 (L) 2/15/2022 02:20   Sodium: 126 (L)   Potassium: 4.8   Chloride: 94 (L)   Glucose: 144 (H)   BUN: 32 (H)   BUN/Creatinine ratio: 26 (H)   GFR est non-AA: 59 (L)   GFR est AA: >60   Ammonia: 75 (H)      2/15/2022 13:45   Sodium: 128 (L)   Potassium: 5.1   Chloride: 98 (L)   Glucose: 110 (H)   BUN: 27 (H)   BUN/Creatinine ratio: 28 (H)   Calcium: 8.1 (L)   Bilirubin, total: 3.4 (H)   Protein, total: 4.9 (L)   Albumin: 2.6 (L)   AST: 74 (H)   Alk. phosphatase: 143 (H)   Ammonia: 71 (H)         PROCEDURE:   PROCEDURES:   trans-jugular intra-hepatic portosystemic shunt (TIPS) creation       right internal jugular (IJ) central venous catheter placement       STAFF: [place]       COMPLICATIONS: None       MEDICATIONS: this procedure was performed with the patient under general   anesthesia        FLUOROSCOPIC DOSE (REFERENCE AIR KERMA): 1057 mGy        INDICATION: Cirrhosis with refractory ascites.       PROCEDURE:       Informed consent was obtained where the risks, benefits and alternatives to the   procedure were explained to the patient.       I. Technique: The procedure was performed following standard maximal barrier   technique which includes, cap, mask, sterile gown, sterile gloves, sterile full   body drape, hand hygiene with alcohol foam, and 2% chlorhexidine for cutaneous   antisepsis. Sterile ultrasound gel and a sterile cover for the US probe was   used. Guidance: sonography (patent right internal jugular vein on ultrasound, image   saved in patient's permanent record),  fluoroscopy    Access: right internal jugular vein; right hepatic vein (HV).     Equipment: 10-Fr. introducer sheath; 5-Fr. angled catheter       Measured pressures were as follows:   1. wedged hepatic vein pressure: 22 mmHg   2. free hepatic vein pressure: 2 mmHg   Wedged hepatogram shows: Nodular opacification consistent with cirrhosis   Exchange was then made over a guide wire for a [place] needle.     Access: Right portal vein.     Catheterization: 5-Fr. straight measuring catheter, with the distal tip left   within the proximal portal vein.         Contrast examination shows:   1. splenic vein: Not interrogated   2. portal vein: Distended, widely patent   3. coronary vein and varices: Not opacified       The nelson-systemic tract was dilated with an 4-mm balloon catheter, allowing the   deployment of a 10-mm x 7-cm Viatorr stent.  This was then dilated with an 8-mm   balloon catheter.  Repeat portography shows:   1. TIPS stent: Widely patent with adequate shunting   2. portal vein: Patent   3. coronary vein: Not opacified   4. Nelson-systemic gradient was measured to be 6 mmHg  (PV, 11 ;RA, 5)         II. The catheter and introducer sheath were then removed over a guide wire and   an 10-Fr.  short introducer sheath was left in place, with the distal tip left   within the superior vena cava.  The sheath was secured in place and flushed with   heparinized saline solution.         Patient tolerated the procedure well.       IMPRESSION   -creation of a nelson-systemic shunt using a  Viatorr stent, balloon to 8 mm.   -final nelson-systemic gradient of 6 mmHg.   -central venous introducer sheath left in place via the right IJ vein.

## 2022-02-16 NOTE — PROGRESS NOTES
Assumed care of patient from off going nurse. Patient alert and oriented. No apparent distress noted. Patient offers no concerns and can verbalize needs. Assessment to follow. Call bell within reach. Bed in lowest, locked position.

## 2022-02-16 NOTE — CONSULTS
[address] [facility], MD, [address], [place], [place]      OTTO Davey, [facility]     Wanda Vick, [facility]   KATHLEEN Dent    Tahira Jani, [facility]       Beverly Meneses Harpal De Richter 136    at [address], [address] [place] [address], Ignacio  22.    [phone]    FAX: [address] [place] [address] Drive, [address], [address]    [phone]    FAX: [phone]       HEPATOLOGY CONSULT NOTE  The patient is well known to and regularly cared for at [facility]. He is a 61 y.o.  male who was found to have chronic liver disease and cirrhosis in 3/2018 when he underwent liver biopsy. Serologic evaluation for markers of chronic liver disease was negative.     The patient has developed the following complications of cirrhosis: esophageal varices, ascites,   Ascites is refractory to diuretics because of hyponatremia with Sna frequently dropping in the 115 range. He is getting paracentesis weekly. He was scheduled for TIPS placement and came to [facility] on Monday for another paracentesis, pre-TIPS consult, ECHO and US duplex in preparation for TIPS. Sk was 6.8 ans he was hospitalized. Electrolytes were corrected and he underwent TIPS placement yesterday. This AM he is in the ICU. He is awake and alert   BP is good. Pulse in the 70s. HB is in the 9s. Sna is up this AM at 130.     Hepatology Plan:  If he can stand and walk around without issues he can go home today  I have started step 1/2 diuretics which he should be able to tolerate now that TIPS is in   We we see him in the office in 1 week.     ASSESSMENT AND PLAN:  Cirrhosis  The diagnosis of cirrhosis is based upon imaging, laboratory studies, complications of cirrhosis.   Cirrhosis is secondary to alcohol. The CTP is 9. Child class B. The MELD score is 22.     Alcohol liver disease  The diagnosis is based upon a history of consuming significant alcohol on a daily basis for many years, liver biopsy, pattern of AST>ALT, an elevation in ferritin and FE saturation, serology that is negative for other causes of chronic liver disease. A liver biopsy performed in 3/2018 shows fatty liver consistent with alcohol etiology and Cirrhosis. The patient has been abstinent from alcohol since 3/2021.     Elevation in Ferritin  There is an elevation in ferritin with Elevated iron saturation. It is unlikely that the patient has hemochromatosis or is a carrier for an HFE gene. HFE genetic testing was negative  The elevation in ferritin is secondary to alcohol use and will come down to normal with abstinence.     Ascites   Ascites developed for the first time in 3/2021. Ascites is refractory to current doses of diuretics because of hyponatremia. Paracentesis is being performed every 1week. Pre-TIPS ECHO showed no elevation in PAP and no TR   He underwent placement of TIPS yesterday.     Lower extremity edema  Edema is persistent despite current dose of diuretics. Will continue the current dose of diuretics at step 2.     Screening for Esophageal varices   The patient has small esophageal varices without prior bleeding. The patient has now had a TIPS placed. This will adequately decompress portal hypertension and esophageal varices if present prior to TIPS will resolve. EGD to screen for esophageal varices is no longer necessary.     Hepatic encephalopathy   Overt HE has not developed to date. There is no reason for treatment with lactulose of xifaxan  Sammonia is normal this AM     Anemia   This is due to multifactorial causes including portal hypertension with chronic GI blood loss, bone marrow suppression secondary to previous alcohol.   The most recent FE studies were from 2021. The serum ferritin is 507  The FE saturation is 51     Thrombocytopenia   This is secondary to cirrhosis. There is no evidence of overt bleeding. No treatment is required. The platelet count is adequate for the patient to undergo procedures without the need for platelet transfusion or platelet growth factors.       SYSTEM REVIEW:  Constitution systems: Negative for fever, chills, weight gain, weight loss. Eyes: Negative for visual changes. ENT: Negative for sore throat, painful swallowing. Respiratory: Negative for cough, hemoptysis, SOB. Cardiology: Negative for chest pain, palpitations. GI:  Negative for constipation or diarrhea. : Negative for urinary frequency, dysuria, hematuria, nocturia. Skin: Negative for rash. Hematology: Negative for easy bruising, blood clots. Musculo-skelatal: Negative for back pain, muscle pain, weakness. Neurologic: Negative for headaches, dizziness, vertigo, memory problems not related to HE. Psychology: Negative for anxiety, depression. FAMILY HISTORY:  The father  of alcoholism. The mother  of pancreas cancer. There is no family history of liver disease.       SOCIAL HISTORY:  The patient is . The patient has 2 children, 3 stepchildren,   The patient has never used tobacco products. The patient has previously consumed alcohol in excess. The patient has been abstinent from alcohol since 3/2021. The patient currently works full time as  for [employer]. PHYSICAL EXAMINATION:  VS: per nursing note  General:  No acute distress. Eyes:  Sclera anicteric. ENT:  No oral lesions. Thyroid normal.  Nodes:  No adenopathy. Skin:  No spider angiomata. No jaundice. Respiratory:  Lungs clear to auscultation. Cardiovascular:  Regular heart rate. Abdomen:  Soft non-tender, Some ascites may be present. Extremities:  No lower extremity edema. Some muscle wasting.   Neurologic:  Alert and oriented. Cranial nerves grossly intact. No asterixis. LABORATORY:  Results for Brittany Oshea (MRN [id number]) as of 2/16/2022 07:13   Ref. Range 2/14/2022 17:27 2/15/2022 13:45 2/16/2022 04:45   WBC Latest Ref Range: 4.6 - 13.2 K/uL  6.4 5.0   HGB Latest Ref Range: 13.0 - 16.0 g/dL  8.8 (L) 9.3 (L)   PLATELET Latest Ref Range: 135 - 420 K/uL  70 (L) 63 (L)   INR Latest Ref Range: 0.8 - 1.2    1.7 (H)    Sodium Latest Ref Range: 136 - 145 mmol/L 124 (L) 128 (L) 130 (L)   Potassium Latest Ref Range: 3.5 - 5.5 mmol/L 5.8 (H) 5.1 4.8   Chloride Latest Ref Range: 100 - 111 mmol/L 94 (L) 98 (L) 99 (L)   CO2 Latest Ref Range: 21 - 32 mmol/L 22 22 20 (L)   Glucose Latest Ref Range: 74 - 99 mg/dL 204 (H) 110 (H) 125 (H)   BUN Latest Ref Range: 7.0 - 18 MG/DL 32 (H) 27 (H) 20 (H)   Creatinine Latest Ref Range: 0.6 - 1.3 MG/DL 1.45 (H) 0.96 0.81   Bilirubin, total Latest Ref Range: 0.2 - 1.0 MG/DL 4.1 (H) 3.4 (H)    Albumin Latest Ref Range: 3.4 - 5.0 g/dL 2.4 (L) 2.6 (L)    ALT Latest Ref Range: 16 - 61 U/L 55 44    AST Latest Ref Range: 10 - 38 U/L 82 (H) 74 (H)    Alk. phosphatase Latest Ref Range: 45 - 117 U/L 172 (H) 143 (H)    Ammonia Latest Ref Range: 11 - 32 UMOL/L  71 (H) 30       RADIOLOGY:  Insert TIPS.   HVPG reduce from       Juanita Rosales MD  [address]  [address], Syed Abreu 58, [address]  [address] [place] [address]

## 2022-02-16 NOTE — DISCHARGE SUMMARY
Discharge Summary    Patient: Leia Schwartz MRN: [id number]  CSN: [id number]    YOB: 1955  Age: 61 y.o. Sex: male    DOA: 2/14/2022 LOS:  LOS: 2 days   Discharge Date:      Primary Care Provider:  June Piña MD    Admission Diagnoses: Hyponatremia [E87.1]  Hyperkalemia [E87.5]    Discharge Diagnoses:    Hospital Problems  Date Reviewed: 2/9/2022          Codes Class Noted POA    * (Principal) Hyperkalemia ICD-10-CM: E87.5  ICD-9-CM: 276.7  2/14/2022 Unknown        Hyponatremia ICD-10-CM: E87.1  ICD-9-CM: 276.1  1/23/2022 Unknown        Acid reflux ICD-10-CM: K21.9  ICD-9-CM: 530.81  Unknown Yes        Alcoholic liver disease ([facility] ICD-10-CM: K70.9  ICD-9-CM: 571.3  12/13/2021 Yes        Cirrhosis ([facility] ICD-10-CM: K74.60  ICD-9-CM: 571.5  12/13/2021 Yes        Ascites ICD-10-CM: R18.8  ICD-9-CM: 789.59  12/13/2021 Yes        Esophageal varices ([facility] ICD-10-CM: I85.00  ICD-9-CM: 456.1  12/13/2021 Yes        Paroxysmal atrial fibrillation ([facility] ICD-10-CM: I48.0  ICD-9-CM: 427.31  12/13/2021 Yes        Alcohol abuse, in remission ICD-10-CM: F10.11  ICD-9-CM: 305.03  12/13/2021 Yes              Discharge Condition: stable     Discharge Medications:     Current Discharge Medication List      CONTINUE these medications which have CHANGED    Details   furosemide (LASIX) 20 mg tablet Take 1 Tablet by mouth daily. Qty: 30 Tablet, Refills: 0  Start date: 2/16/2022      spironolactone (ALDACTONE) 50 mg tablet Take 1 Tablet by mouth daily. Qty: 30 Tablet, Refills: 0  Start date: 2/17/2022         CONTINUE these medications which have NOT CHANGED    Details   Eliquis 5 mg tablet       flecainide (TAMBOCOR) 100 mg tablet Take 50 mg by mouth every twelve (12) hours. pantoprazole (PROTONIX) 20 mg tablet Take 20 mg by mouth daily.       sildenafil citrate (VIAGRA) 100 mg tablet take 1 tablet by mouth 1 hour prior to intercourse      ustekinaumab 90 mg/mL injection              Procedures : TIPS, paracentesis     Consults: Pulmonary/Critical Care dr Adrian Bruce and Dr. Jared Arzate  BP (!) 124/56   Pulse 85   Temp 98.5 °F (36.9 °C)   Resp 24   Ht 6' 2\" (1.88 m)   Wt 100.7 kg (222 lb 0.1 oz)   SpO2 100%   BMI 28.50 kg/m²     General: Awake, cooperative, no acute distress    HEENT: NC, Atraumatic. PERRLA, EOMI. Anicteric sclerae. Rt IJ site covered with gauze   Lungs:  CTA Bilaterally. No Wheezing/Rhonchi/Rales. Heart:  Regular  rhythm,  No murmur, No Rubs, No Gallops  Abdomen: Soft, mild distended, Non tender. +Bowel sounds,   Extremities: No c/c/e  Psych:   Not anxious or agitated. Neurologic:  No acute neurological deficits. Admission HPI :   Aryan Elizondo is a 61 y.o. male with cirrhosis, hypertension PAF presented to ER due to abnormal lab. He follow-up with Dr. Rahat Espino for his cirrhosis,  he was found potassium 6.3. Repeated in ER 5.8 sodium was 124. He has recurrent ascites-needs paracentesis twice a week. Follow-up with Dr. Rahat Espino, need to further evaluation for possible TIPS. Patient denies any abdominal pain. Received covid 19 vaccine .      Denies any slurred speech/headache/cp/n/v/blurred vission/d/c/palpitation/gait change/bleeding. Denies smoking/ any alcohol or drug use. Hospital Course :   Aryan Elizondo is a 61 y.o. male with cirrhosis, hypertension PAF was admitted hyperkalemia and recurrent ascites. Hyperkalemia, Received Lasix, Kayexalate, calcium gluconate, insulin/d50 on admission. His hyperkalemia resolved        Hyponatremia, chronic, he received  normal saline infusion at  low rate. His na level 130 on discharge. Need to f/u with pcp for continue monitoring         Alcoholic liver disease  In remission      Cirrhosis, varices, he received  albumin, he d/u with Dr. Power Pandya.  Fib, chronic ac stable during his stay   Continue home medication, balance electrolytes  On eliquis at home-hold for procedure      Thrombocytopenia  Due to cirrhosis  Continue monitoring        Ascites- recurrent. He received paracentesis and TIPS  Procedure. He tolerated well, no bleeding and no fever noted. He was observed at icu and continue doing well. He was cleared to be d/c per IR and dr. Meek Richter. Discharge planning discussed with patient, pt agrees  with the plan and no questions and concerns at this point. Activity: Activity as tolerated    Diet: Regular Diet    Follow-up: PCP Dr. Meek Richter     Disposition: home     Minutes spent on discharge: 45 min       Labs: Results:       Chemistry Recent Labs     02/16/22  0445 02/15/22  1345 02/15/22  0220 02/14/22  1727 02/14/22  1727   * 110* 144*   < > 204*   * 128* 126*   < > 124*   K 4.8 5.1 4.8   < > 5.8*   CL 99* 98* 94*   < > 94*   CO2 20* 22 23   < > 22   BUN 20* 27* 32*   < > 32*   CREA 0.81 0.96 1.23   < > 1.45*   CA 8.9 8.1* 8.6   < > 8.5   AGAP 11 8 9   < > 8   BUCR 25* 28* 26*   < > 22*   AP  --  143*  --   --  172*   TP  --  4.9*  --   --  5.8*   ALB  --  2.6*  --   --  2.4*   GLOB  --  2.3  --   --  3.4   AGRAT  --  1.1  --   --  0.7*    < > = values in this interval not displayed. CBC w/Diff Recent Labs     02/16/22 0445 02/15/22  1345 02/15/22  0220 02/14/22  1629 02/14/22  1629   WBC 5.0 6.4 6.4   < > 8.4   RBC 2.53* 2.35* 2.38*   < > 3.07*   HGB 9.3* 8.8* 9.0*   < > 11.3*   HCT 26.4* 24.5* 25.1*   < > 31.5*   PLT 63* 70* 73*   < > 103*   GRANS 83*  --  68  --  69   LYMPH 7*  --  11*  --  11*   EOS 1  --  5  --  5    < > = values in this interval not displayed. Cardiac Enzymes Recent Labs     02/14/22  1727   CPK 87   CKND1 4.8*      Coagulation Recent Labs     02/15/22  1345 02/15/22  0221 02/14/22  1410 02/14/22  1410   PTP 19.3* 18.1*   < > 16.4*   INR 1.7* 1.6*   < > 1.4*   APTT  --   --   --  40.5*    < > = values in this interval not displayed.        Lipid Panel Lab Results   Component Value Date/Time    Cholesterol, total 182 09/13/2021 10:30 AM    HDL Cholesterol 52 09/13/2021 10:30 AM    LDL,Direct 173 (H) 05/30/2019 10:15 AM    LDL, calculated 109 09/13/2021 10:30 AM    Triglyceride 105 09/13/2021 10:30 AM    CHOL/HDL Ratio 3.5 09/13/2021 10:30 AM      BNP No results for input(s): BNPP in the last 72 hours. Liver Enzymes Recent Labs     02/15/22  1345   TP 4.9*   ALB 2.6*   *      Thyroid Studies Lab Results   Component Value Date/Time    TSH 1.94 02/15/2022 02:20 AM            Significant Diagnostic Studies: US GUIDE PARACENTESIS    Result Date: 2/15/2022  PROCEDURE:  ULTRASOUND GUIDED THERAPEUTIC PARACENTESIS INDICATION: Symptomatic Ascites PERFORMED BY:  Delia Gupta PA-C ATTENDING PHYSICIAN:  Noemi Rowan MD SUPERVISION: General TECHNIQUE & FINDINGS: Informed consent was obtained prior to the procedure. Standard pre-procedure time out taken at 0807 hours. Preliminary ultrasound of the abdomen shows a moderate amount of ascites, ultrasound image saved in PACS. A right lower quadrant approach was chosen. Sterile probe cover and gel were used. The skin was marked, prepped and draped in sterile fashion and 1% Lidocaine was used for local anesthesia. A 6 [place] Lyn Yueh sheathed needle was advanced into the peritoneal cavity, was connected to a vacuum, and a total of 3.3 liters of clear yellow fluid was removed. A specimen was collected and taken to the lab, as requested. The patient tolerated the procedure well and there were no immediate complications. GUIDANCE: Ultrasound guidance was used to position (and confirm the position of) the Yueh sheathed needle. Image(s) saved in PACS: Ultrasound     Successful ultrasound guided paracentesis of approximately 3.3 liters of clear yellow fluid.     XR CHEST PORT    Result Date: 2/16/2022  EXAM: XR CHEST PORT CLINICAL INDICATION/HISTORY: TIPS -Additional: None COMPARISON: None TECHNIQUE: Portable frontal view of the chest _______________ FINDINGS: SUPPORT DEVICES: Right IJ vascular sheath within the mid SVC. HEART AND MEDIASTINUM: Cardiomediastinal silhouette within normal limits. LUNGS AND PLEURAL SPACES: No dense consolidation, large effusion or pneumothorax. _______________     No acute cardiopulmonary abnormality. IR INSERT TIPS HEPATIC SHUNT    Result Date: 2/16/2022  PROCEDURES: trans-jugular intra-hepatic portosystemic shunt (TIPS) creation right internal jugular (IJ) central venous catheter placement STAFF: Katiuska COMPLICATIONS: None MEDICATIONS: this procedure was performed with the patient under general anesthesia FLUOROSCOPIC DOSE (REFERENCE AIR KERMA): 1057 mGy INDICATION: Cirrhosis with refractory ascites. PROCEDURE: Informed consent was obtained where the risks, benefits and alternatives to the procedure were explained to the patient. I. Technique: The procedure was performed following standard maximal barrier technique which includes, cap, mask, sterile gown, sterile gloves, sterile full body drape, hand hygiene with alcohol foam, and 2% chlorhexidine for cutaneous antisepsis. Sterile ultrasound gel and a sterile cover for the US probe was used. Guidance: sonography (patent right internal jugular vein on ultrasound, image saved in patient's permanent record),  fluoroscopy Access: right internal jugular vein; right hepatic vein (HV). Equipment: 10-Fr. introducer sheath; 5-Fr. angled catheter Measured pressures were as follows: 1. wedged hepatic vein pressure: 22 mmHg 2. free hepatic vein pressure: 2 mmHg Wedged hepatogram shows: Nodular opacification consistent with cirrhosis Exchange was then made over a guide wire for a Col[place]into needle. Access: Right portal vein. Catheterization: 5-Fr. straight measuring catheter, with the distal tip left within the proximal portal vein.   Contrast examination shows: 1. splenic vein: Not interrogated 2. portal vein: Distended, widely patent 3. coronary vein and varices: Not opacified The cecil-systemic tract was dilated with an 4-mm balloon catheter, allowing the deployment of a 10-mm x 7-cm Viatorr stent. This was then dilated with an 8-mm balloon catheter. Repeat portography shows: 1. TIPS stent: Widely patent with adequate shunting 2. portal vein: Patent 3. coronary vein: Not opacified 4. Nelson-systemic gradient was measured to be 6 mmHg  (PV, 11 ;RA, 5)  II. The catheter and introducer sheath were then removed over a guide wire and an 10-Fr. short introducer sheath was left in place, with the distal tip left within the superior vena cava. The sheath was secured in place and flushed with heparinized saline solution. Patient tolerated the procedure well.     -creation of a nelson-systemic shunt using a  Viatorr stent, balloon to 8 mm. -final nelson-systemic gradient of 6 mmHg. -central venous introducer sheath left in place via the right IJ vein. ECHO ADULT COMPLETE    Result Date: 2/14/2022    Left Ventricle: Left ventricle is mildly dilated. Normal wall thickness. See diagram for wall motion findings. Normal left ventricular systolic function with a visually estimated EF of 60 - 65%. Grade I diastolic dysfunction with normal LAP.   Left Atrium: Left atrium is mildly dilated.   Right Ventricle: Right ventricle is mildly dilated. Hyperdynamic systolic function.   Right Atrium: Right atrium is mildly dilated.   Mitral Valve: Mildly thickened anterior leaflet. Moderately calcified anterior leaflet. Mild mitral annular calcification. Mildly thickened subvalvular apparatus. Mild transvalvular regurgitation.   Pulmonary artery : Estimated pulmonary arterial systolic pressure is 40 mmhg. Pulmonary hypertension found to be mild.   Interatrial septum : No interatrial shunt visualized on color Doppler. The septum is bowing into the LA     US DPLX ABD VISC A/V LTD W CONT    Result Date: 2/15/2022  EXAM: ULTRASOUND DUPLEX ABDOMINAL/LIVER VASCULATURE INDICATION: Pre-TIPS evaluation. Cirrhosis with refractory ascites.  COMPARISON: No prior studies TECHNIQUE: Multiple gray scale sonographic images of the hepatic vasculature were obtained. Grayscale, color flow Doppler imaging, and velocity spectral waveform analysis of the hepatic vessels was performed (duplex imaging). Contrast examination with Veria Odilon microbubbles evaluation performed. ____________________ FINDINGS: Main portal vein is patent with appropriate antegrade/hepatopetal  blood flow demonstrated with color and spectral Doppler imaging. Left and right portal veins demonstrate hepatopetal flow. No portal vein thrombus is identified. Portal vein waveforms are not pulsatile. Diameter of main portal vein is 15 mm. Estimated velocity of main portal vein flow is 27.4 cm/s. Estimated velocity of left portal vein is 19.8 cm/s. Estimated velocity right portal vein is 21.9 cm/s. Hepatic arteries identified with normal antegrade arterial waveforms demonstrated with color and spectral Doppler imaging. Estimated hepatic artery resistive index is 0.86. Normal hepatofugal flow dynamics seen in the hepatic veins with normal flow in the inferior vena cava, demonstrated with color and spectral Doppler imaging. Also noted is nodular echogenic hepatic parenchyma correlating with known cirrhosis. Moderate ascites. ____________________     1. Patent portal veins without evidence of thrombosis. Hepatopedal flow. 2. Patent hepatic veins. 3. Cirrhotic appearing echogenic liver. Perihepatic ascites.             [facility]     CC: Rodrigo Segura MD

## 2022-02-16 NOTE — ROUTINE PROCESS
Bedside shift change report given to Jackelyn Lima (oncoming nurse) by Aislinn Mcallister RN (offgoing nurse). Report included the following information SBAR, Kardex, Intake/Output and MAR.

## 2022-02-16 NOTE — PROGRESS NOTES
CM notes patient has order to transfer to 3N and has order to remove right IJ sheath this morning. Patient is s/p TIPS procedure. CM to watch for recommendations from therapy to assist with identifying any transition of care needs. CM to continue to monitor and remain available. CM expects patient may discharge in the next 24 hours. PT note shows that recommendation is TBD depending upon further evaluation. CM spoke with nursing to ask PT/OT to assess patient when appropriate after removal of sheath and prior to discharge to assist in identifying any transition of care needs. Cm anticipates patient will discharge within the next 24 hours.      Care Management Interventions  Transition of Care Consult (CM Consult): Discharge Planning  Support Systems: Spouse/Significant Other  Confirm Follow Up Transport: Family  Discharge Location  Patient Expects to be Discharged to[de-identified]  (discharge home with phsician follow up and family assistance)

## 2022-02-21 ENCOUNTER — OFFICE VISIT (OUTPATIENT)
Dept: HEMATOLOGY | Age: 67
End: 2022-02-21
Payer: COMMERCIAL

## 2022-02-21 ENCOUNTER — HOSPITAL ENCOUNTER (OUTPATIENT)
Dept: INTERVENTIONAL RADIOLOGY/VASCULAR | Age: 67
Discharge: HOME | End: 2022-02-21
Attending: INTERNAL MEDICINE

## 2022-02-21 ENCOUNTER — HOSPITAL ENCOUNTER (OUTPATIENT)
Dept: LAB | Age: 67
Discharge: HOME | End: 2022-02-21

## 2022-02-21 VITALS
HEART RATE: 85 BPM | OXYGEN SATURATION: 91 % | SYSTOLIC BLOOD PRESSURE: 80 MMHG | HEIGHT: 74 IN | DIASTOLIC BLOOD PRESSURE: 50 MMHG | WEIGHT: 229.4 LBS | BODY MASS INDEX: 29.44 KG/M2

## 2022-02-21 DIAGNOSIS — K70.31 ALCOHOLIC CIRRHOSIS OF LIVER WITH ASCITES (HCC): Primary | ICD-10-CM

## 2022-02-21 LAB — SENTARA SPECIMEN COL,SENBCF: NORMAL

## 2022-02-21 PROCEDURE — 99001 SPECIMEN HANDLING PT-LAB: CPT

## 2022-02-21 PROCEDURE — 99215 OFFICE O/P EST HI 40 MIN: CPT | Performed by: INTERNAL MEDICINE

## 2022-02-21 RX ORDER — LACTULOSE 10 G/15ML
20 SOLUTION ORAL; RECTAL 2 TIMES DAILY
Qty: 946 ML | Refills: 3 | Status: SHIPPED | OUTPATIENT
Start: 2022-02-21 | End: 2022-03-08

## 2022-02-21 NOTE — Clinical Note
3/26/2022    Patient: Nakul Fournier   YOB: 1955   Date of Visit: 2/21/2022     Bruno Walker, [address] [address]  Via Fax: [phone]    Dear Bruno Walker MD,      Thank you for referring Mr. Dannielle Monroy to [address] [place] [address],[address] for evaluation. My notes for this consultation are attached. If you have questions, please do not hesitate to call me. I look forward to following your patient along with you.       Sincerely,    Mili Ferrari MD

## 2022-02-22 LAB
A-G RATIO,AGRAT: 1.7 RATIO (ref 1.1–2.6)
ABSOLUTE LYMPHOCYTE COUNT, 10803: 1.3 K/UL (ref 1–4.8)
ALBUMIN SERPL-MCNC: 3.3 G/DL (ref 3.5–5)
ALP SERPL-CCNC: 180 U/L (ref 40–125)
ALT SERPL-CCNC: 34 U/L (ref 5–40)
ANION GAP SERPL CALC-SCNC: 10 MMOL/L (ref 3–15)
AST SERPL W P-5'-P-CCNC: 61 U/L (ref 10–37)
BASOPHILS # BLD: 0.1 K/UL (ref 0–0.2)
BASOPHILS NFR BLD: 1 % (ref 0–2)
BILIRUB SERPL-MCNC: 4.3 MG/DL (ref 0.2–1.2)
BILIRUBIN, DIRECT,CBIL: 1.4 MG/DL (ref 0–0.3)
BUN SERPL-MCNC: 18 MG/DL (ref 6–22)
CALCIUM SERPL-MCNC: 8.5 MG/DL (ref 8.4–10.5)
CHLORIDE SERPL-SCNC: 96 MMOL/L (ref 98–110)
CO2 SERPL-SCNC: 25 MMOL/L (ref 20–32)
CREAT SERPL-MCNC: 1.1 MG/DL (ref 0.8–1.6)
EOSINOPHIL # BLD: 0.7 K/UL (ref 0–0.5)
EOSINOPHIL NFR BLD: 7 % (ref 0–6)
ERYTHROCYTE [DISTWIDTH] IN BLOOD BY AUTOMATED COUNT: 16.3 % (ref 10–15.5)
GFRAA, 66117: >60
GFRNA, 66118: >60
GLOBULIN,GLOB: 1.9 G/DL (ref 2–4)
GLUCOSE SERPL-MCNC: 126 MG/DL (ref 70–99)
GRANULOCYTES,GRANS: 65 % (ref 40–75)
HCT VFR BLD AUTO: 29.4 % (ref 39.3–51.6)
HGB BLD-MCNC: 9.8 G/DL (ref 13.1–17.2)
IMMATURE PLATELET FRACTION: 4.3 % (ref 1.1–7.1)
INR PPP: 1.41 (ref 0.89–1.29)
LYMPHOCYTES, LYMLT: 13 % (ref 20–45)
MACROCYTOSIS,MACRO: ABNORMAL
MCH RBC QN AUTO: 37 PG (ref 26–34)
MCHC RBC AUTO-ENTMCNC: 33 G/DL (ref 31–36)
MCV RBC AUTO: 111 FL (ref 80–95)
MONOCYTES # BLD: 1.4 K/UL (ref 0.1–1)
MONOCYTES NFR BLD: 14 % (ref 3–12)
NEUTROPHILS # BLD AUTO: 6.3 K/UL (ref 1.8–7.7)
NORMACHROMIC RBC, 868: ABNORMAL
PLATELET # BLD AUTO: 87 K/UL (ref 140–440)
PMV BLD AUTO: 10 FL (ref 9–13)
POTASSIUM SERPL-SCNC: 3.9 MMOL/L (ref 3.5–5.5)
PROT SERPL-MCNC: 5.2 G/DL (ref 6.2–8.1)
PROTHROMBIN TIME: 14.4 SEC (ref 9–13)
RBC # BLD AUTO: 2.64 M/UL (ref 3.8–5.8)
SMEAR EVAL, 1131: ABNORMAL
SODIUM SERPL-SCNC: 131 MMOL/L (ref 133–145)
WBC # BLD AUTO: 9.7 K/UL (ref 4–11)

## 2022-03-02 ENCOUNTER — HOSPITAL ENCOUNTER (OUTPATIENT)
Dept: ULTRASOUND IMAGING | Age: 67
Discharge: HOME | End: 2022-03-02
Attending: RADIOLOGY
Payer: COMMERCIAL

## 2022-03-02 DIAGNOSIS — Z95.828 S/P TIPS (TRANSJUGULAR INTRAHEPATIC PORTOSYSTEMIC SHUNT): ICD-10-CM

## 2022-03-02 PROCEDURE — 93976 VASCULAR STUDY: CPT

## 2022-03-08 ENCOUNTER — HOSPITAL ENCOUNTER (OUTPATIENT)
Dept: LAB | Age: 67
Discharge: HOME | End: 2022-03-08

## 2022-03-08 ENCOUNTER — OFFICE VISIT (OUTPATIENT)
Dept: HEMATOLOGY | Age: 67
End: 2022-03-08
Payer: COMMERCIAL

## 2022-03-08 VITALS
BODY MASS INDEX: 29.98 KG/M2 | SYSTOLIC BLOOD PRESSURE: 119 MMHG | HEART RATE: 90 BPM | DIASTOLIC BLOOD PRESSURE: 67 MMHG | WEIGHT: 233.6 LBS | HEIGHT: 74 IN | RESPIRATION RATE: 18 BRPM | OXYGEN SATURATION: 99 %

## 2022-03-08 DIAGNOSIS — K70.31 ALCOHOLIC CIRRHOSIS OF LIVER WITH ASCITES (HCC): Primary | ICD-10-CM

## 2022-03-08 LAB — SENTARA SPECIMEN COL,SENBCF: NORMAL

## 2022-03-08 PROCEDURE — 99001 SPECIMEN HANDLING PT-LAB: CPT

## 2022-03-08 PROCEDURE — 99214 OFFICE O/P EST MOD 30 MIN: CPT | Performed by: INTERNAL MEDICINE

## 2022-03-08 RX ORDER — LACTULOSE 10 G/15ML
20 SOLUTION ORAL; RECTAL 3 TIMES DAILY
Qty: 946 ML | Refills: 3
Start: 2022-03-08 | End: 2022-06-10

## 2022-03-08 NOTE — PROGRESS NOTES
[facility], MD, [address], [place], [place]      Claudean Rouleau, PA-C Harold Lang, [facility]     Wanda Vick, [facility]   ERNIE Mederos-JULISSA Gibson, [facility]       Beverly Deputado Harpal De [address] 136    at [address], [address] [place] [address], [facility] 22.    [phone]    FAX: [address] [place] [address]    at [address], [address]    [phone]    FAX: [phone]       Patient Care Team:  Mario Nix MD as PCP - General (Internal Medicine)  Manuela Mcgraw MD (Internal Medicine)      Problem List  Date Reviewed: 3/26/2022          Codes Class Noted    S/P TIPS (transjugular intrahepatic portosystemic shunt) ICD-10-CM: X10.289  ICD-9-CM: V45.89  3/26/2022        Hyperkalemia ICD-10-CM: E87.5  ICD-9-CM: 276.7  2/14/2022        Acid reflux ICD-10-CM: K21.9  ICD-9-CM: 530.81  Unknown        Alcoholic liver disease ([facility] ICD-10-CM: K70.9  ICD-9-CM: 571.3  12/13/2021        Cirrhosis ([facility] ICD-10-CM: K74.60  ICD-9-CM: 571.5  12/13/2021        Ascites ICD-10-CM: R18.8  ICD-9-CM: 789.59  12/13/2021        Esophageal varices ([facility] ICD-10-CM: I85.00  ICD-9-CM: 456.1  12/13/2021        Psoriasis ICD-10-CM: L40.9  ICD-9-CM: 696.1  12/13/2021        Paroxysmal atrial fibrillation ([facility] ICD-10-CM: I48.0  ICD-9-CM: 427.31  12/13/2021        Alcohol abuse, in remission ICD-10-CM: F10.11  ICD-9-CM: 305.03  12/13/2021              Roopa Greenberg is being seen at [address] for management of 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   The active problem list, all pertinent past medical history, medications, liver histology, endoscopic studies,   radiologic findings and laboratory findings related to the liver disorder were reviewed and discussed with the patient.       The patient is a 61 y.o.  male who was found to have chronic liver disease and cirrhosis in 3/2018 when he underwent liver biopsy.       Serologic evaluation for markers of chronic liver disease was negative.     The patient has developed the following complications of cirrhosis: esophageal varices, ascites, hyponatremia    Ascites was refractory to diuretics and treated with TIPS in 2/2022. US Duplex of TIPS in 3/2022 showed good flow.     The patient has the following symptoms which could be attributed to the liver disorder:    fatigue,   swelling of the lower extremities has greatly improved and is now only mild. The patient is not experiencing the following symptoms which are commonly seen in this liver disorder:   problems concentrating,   Ascites has resolved following TIPS  hematemesis,   hematochezia.     The patient has Mild limitations in functional activities which can be attributed to the liver disease         ASSESSMENT AND PLAN:  Cirrhosis  The diagnosis of cirrhosis is based upon imaging, laboratory studies, complications of cirrhosis. Cirrhosis is secondary to alcohol.        The CTP is 9. Child class B. The MELD score is 24.     Alcohol liver disease  The diagnosis is based upon a history of consuming significant alcohol on a daily basis for many years, liver biopsy, pattern of AST>ALT, an elevation in ferritin and FE saturation, serology that is negative for other causes of chronic liver disease. A liver biopsy performed in 3/2018 shows fatty liver consistent with alcohol etiology and Cirrhosis. The patient has been abstinent from alcohol since 3/2021.     Elevation in Ferritin  There is an elevation in ferritin with Elevated iron saturation. It is unlikely that the patient has hemochromatosis or is a carrier for an HFE gene.   HFE genetic testing was negative  The elevation in ferritin is secondary to alcohol use and will come down to normal with abstinence.     Ascites   Ascites developed for the first time in 3/2021. Ascites has resolved following TIPS and with step 1/2 diuretics.       Lower extremity edema  Edema has resolved following TIPS and with step 1/2 diuretics      Screening for Esophageal varices   The patient has had a TIPS placed. This will adequately decompress portal hypertension and esophageal varices if present prior to TIPS will resolve. EGD to screen for esophageal varices is no longer necessary.     Hepatic encephalopathy   Overt HE has not developed to date. He was started on lactulose TID to reduce the risk of post-TIPS HE. He is not having any complications from lactulose. There has been no post-TIPS HE.     Hyponatremia  This is secondary to cirrhosis and diuretics  THis has resolved following TIPS.    Anemia   This is due to multifactorial causes including portal hypertension with chronic GI blood loss, bone marrow suppression secondary to previous alcohol. The most recent FE studies were from 12/2021. The serum ferritin is 507  The FE saturation is 51     Thrombocytopenia   This is secondary to cirrhosis. There is no evidence of overt bleeding. No treatment is required. The platelet count is adequate for the patient to undergo procedures without the need for platelet transfusion or platelet growth factors.     Screening for Hepatocellular Carcinoma  HCC screening has not been not been performed since 3/2021.   AFP was ordered today and ultrasound will be scheduled.     Treatment of other medical problems in patients with chronic liver disease  There are no contraindications for the patient to take most medications that are necessary for treatment of other medical issues.     The patient has cirrhrosis and should avoid taking NSAIDs which are associated with a higher rate of developing TOMA.         The patient can take any medications utilized for treatment of DM, statins to treat hypercholesterolemia     Counseling for alcohol in patients with chronic liver disease  The patient was counseled regarding alcohol consumption and the effect of alcohol on chronic liver disease. The patient has not consumed alcohol since 3/2021.     Osteoporosis  The risk of osteoporosis is increased in patients with cirrhosis. DEXA bone density to assess for osteoporosis has not been performed. This should be ordered by the patients primary care physician.     Vaccinations   Vaccination for viral hepatitis A and B is recommended since the patient has no serologic evidence of previous exposure or vaccination with immunity.     The patient has received 2 doses of COVID-19 vaccine.       Routine vaccinations against other bacterial and viral agents can be performed as indicated. Annual flu vaccination should be administered if indicated. ALLERGIES  No Known Allergies    MEDICATIONS:  Current Outpatient Medications   Medication Sig    lactulose (CHRONULAC) 10 gram/15 mL solution Take 30 mL by mouth three (3) times daily.  furosemide (LASIX) 20 mg tablet Take 1 Tablet by mouth daily.  spironolactone (ALDACTONE) 50 mg tablet Take 1 Tablet by mouth daily.  Eliquis 5 mg tablet     flecainide (TAMBOCOR) 100 mg tablet Take 50 mg by mouth every twelve (12) hours.  pantoprazole (PROTONIX) 20 mg tablet Take 20 mg by mouth daily.  sildenafil citrate (VIAGRA) 100 mg tablet take 1 tablet by mouth 1 hour prior to intercourse    ustekinaumab 90 mg/mL injection      No current facility-administered medications for this visit. SYSTEM REVIEW NOT RELATED TO LIVER DISEASE OR REVIEWED ABOVE:  Constitution systems: Negative for fever, chills, weight gain, weight loss. Eyes: Negative for visual changes. ENT: Negative for sore throat, painful swallowing. Respiratory: Negative for cough, hemoptysis, SOB. Cardiology: Negative for chest pain, palpitations.   GI:  Negative for constipation or diarrhea. : Negative for urinary frequency, dysuria, hematuria, nocturia. Skin: Negative for rash. Hematology: Negative for easy bruising, blood clots. Musculo-skelatal: Negative for back pain, muscle pain, weakness. Neurologic: Negative for headaches, dizziness, vertigo, memory problems not related to HE. Psychology: Negative for anxiety, depression.         FAMILY HISTORY:  The father  of alcoholism. The mother  of pancreas cancer. There is no family history of liver disease.       SOCIAL HISTORY:  The patient is . The patient has 2 children, 3 stepchildren,   The patient has never used tobacco products. The patient has previously consumed alcohol in excess. The patient has been abstinent from alcohol since 3/2021. The patient currently works full time as  for [employer].          PHYSICAL EXAMINATION:  Visit Vitals  Visit Vitals  /67 (BP 1 Location: Left arm, BP Patient Position: Sitting)   Pulse 90   Resp 18   Ht 6' 2\" (1.88 m)   Wt 233 lb 9.6 oz (106 kg)   SpO2 99%   BMI 29.99 kg/m²     PHYSICAL EXAMINATION:  Visit Vitals  /67 (BP 1 Location: Left arm, BP Patient Position: Sitting)   Pulse 90   Resp 18   Ht 6' 2\" (1.88 m)   Wt 233 lb 9.6 oz (106 kg)   SpO2 99%   BMI 29.99 kg/m²       General: No acute distress. Eyes: Sclera anicteric. ENT: No oral lesions. Thyroid normal.  Nodes: No adenopathy. Skin: No spider angiomata. No jaundice. No palmar erythema. Respiratory: Lungs clear to auscultation. Cardiovascular: Regular heart rate. No murmurs. No JVD. Abdomen: Soft non-tender, liver size normal to percussion/palpation. Spleen not palpable. No obvious ascites. Extremities: 2+ lower edema. No muscle wasting. Neurologic: Alert and oriented. Cranial nerves grossly intact. No asterixis.     LABORATORY STUDIES:  Liver [place] of [address] & Units 3/8/2022 2022   WBC 4.0 - 11.0 K/uL 7.2 9.7   ANC 1.8 - 7.7 K/uL 3.8 6.3   HGB 13.1 - 17.2 g/dL 9.6 (L) 9.8 (L)    - 440 K/uL 93 (L) 87 (L)   INR 0.89 - 1.29 1.39 (H) 1.41 (H)   AST 10 - 37 U/L 50 (H) 61 (H)   ALT 5 - 40 U/L 20 34   Alk Phos 40 - 125 U/L 165 (H) 180 (H)   Bili, Total 0.2 - 1.2 mg/dL 5.6 (H) 4.3 (H)   Bili, Direct 0.0 - 0.3 mg/dL 1.9 (H) 1.4 (H)   Albumin 3.5 - 5.0 g/dL 3.4 (L) 3.3 (L)   BUN 6 - 22 mg/dL 5 (L) 18   Creat 0.8 - 1.6 mg/dL 0.6 (L) 1.1   Na 133 - 145 mmol/L 139 131 (L)   K 3.5 - 5.5 mmol/L 3.8 3.9   Cl 98 - 110 mmol/L 103 96 (L)   CO2 20 - 32 mmol/L 24 25   Glucose 70 - 99 mg/dL 92 126 (H)     SEROLOGIES:  Serologies Latest Ref Rng & Units 2021   Hep A Ab, Total Negative Negative   Hep B Surface Ag None Detec None Detected   Hep B Core Ab, Total None Detec None Detected   Hep B Surface Ab None Detec None Detected   Ferritin 22 - 322 ng/mL 507 (H)   Iron % Saturation 20 - 50 % 51 (H)   C-ANCA Neg:<1:20 titer <1:20   ANCA Neg:<1:20 titer <1:20   ASMCA 0 - 19 Units 16   M2 Ab 0.0 - 20.0 Units <20.0   Ceruloplasmin 16.0 - 31.0 mg/dL 26.9   Alpha-1 antitrypsin level 101 - 187 mg/dL 184        LIVER HISTOLOGY:  Not available or performed     ENDOSCOPIC PROCEDURES:  2021. EGD by Dr Renee Scanlon. Small esophageal varices. NO banding.     RADIOLOGY:  3/2021. CT scan abdomen with IV contrast.  Changes consistent with cirrhosis. No liver mass lesions. No dilated bile ducts. Small ascites.     OTHER TESTIN2022. TIPS placement. Pre-TIPS. Free HV 2, wedge HV 22, HVPG=20 mm Hg. Post-TIPS. RA 5, PP 11, PSG= 6 mm Hg.     FOLLOW-UP:  All of the issues listed above in the Assessment and Plan were discussed with the patient. All questions were answered.   The patient expressed a clear understanding of the above.     Follow-up Eric Eliazar Xion[address] 32 in 4 weeks to review all data and determine the treatment plan.        Ricki Ashby MD  [facility] [address] [facility], [place] [id number] [address] [facility]

## 2022-03-09 LAB
A-G RATIO,AGRAT: 1.4 RATIO (ref 1.1–2.6)
ABSOLUTE LYMPHOCYTE COUNT, 10803: 1.4 K/UL (ref 1–4.8)
ALBUMIN SERPL-MCNC: 3.4 G/DL (ref 3.5–5)
ALP SERPL-CCNC: 165 U/L (ref 40–125)
ALT SERPL-CCNC: 20 U/L (ref 5–40)
ANION GAP SERPL CALC-SCNC: 12 MMOL/L (ref 3–15)
AST SERPL W P-5'-P-CCNC: 50 U/L (ref 10–37)
BASOPHILS # BLD: 0.1 K/UL (ref 0–0.2)
BASOPHILS NFR BLD: 1 % (ref 0–2)
BILIRUB SERPL-MCNC: 5.6 MG/DL (ref 0.2–1.2)
BILIRUBIN, DIRECT,CBIL: 1.9 MG/DL (ref 0–0.3)
BUN SERPL-MCNC: 5 MG/DL (ref 6–22)
CALCIUM SERPL-MCNC: 8.9 MG/DL (ref 8.4–10.5)
CHLORIDE SERPL-SCNC: 103 MMOL/L (ref 98–110)
CO2 SERPL-SCNC: 24 MMOL/L (ref 20–32)
CREAT SERPL-MCNC: 0.6 MG/DL (ref 0.8–1.6)
EOSINOPHIL # BLD: 0.6 K/UL (ref 0–0.5)
EOSINOPHIL NFR BLD: 8 % (ref 0–6)
ERYTHROCYTE [DISTWIDTH] IN BLOOD BY AUTOMATED COUNT: 18.6 % (ref 10–15.5)
GFRAA, 66117: >60
GFRNA, 66118: >60
GLOBULIN,GLOB: 2.5 G/DL (ref 2–4)
GLUCOSE SERPL-MCNC: 92 MG/DL (ref 70–99)
GRANULOCYTES,GRANS: 53 % (ref 40–75)
HCT VFR BLD AUTO: 28.7 % (ref 39.3–51.6)
HGB BLD-MCNC: 9.6 G/DL (ref 13.1–17.2)
IMMATURE PLATELET FRACTION: 4.4 % (ref 1.1–7.1)
INR PPP: 1.39 (ref 0.89–1.29)
LYMPHOCYTES, LYMLT: 20 % (ref 20–45)
MCH RBC QN AUTO: 38 PG (ref 26–34)
MCHC RBC AUTO-ENTMCNC: 33 G/DL (ref 31–36)
MCV RBC AUTO: 115 FL (ref 80–95)
MONOCYTES # BLD: 1.3 K/UL (ref 0.1–1)
MONOCYTES NFR BLD: 18 % (ref 3–12)
NEUTROPHILS # BLD AUTO: 3.8 K/UL (ref 1.8–7.7)
PLATELET # BLD AUTO: 93 K/UL (ref 140–440)
PMV BLD AUTO: 10.5 FL (ref 9–13)
POTASSIUM SERPL-SCNC: 3.8 MMOL/L (ref 3.5–5.5)
PROT SERPL-MCNC: 5.9 G/DL (ref 6.2–8.1)
PROTHROMBIN TIME: 14.2 SEC (ref 9–13)
RBC # BLD AUTO: 2.5 M/UL (ref 3.8–5.8)
SODIUM SERPL-SCNC: 139 MMOL/L (ref 133–145)
WBC # BLD AUTO: 7.2 K/UL (ref 4–11)

## 2022-03-18 PROBLEM — L40.9 PSORIASIS: Status: ACTIVE | Noted: 2021-12-13

## 2022-03-18 PROBLEM — K74.60 CIRRHOSIS (HCC): Status: ACTIVE | Noted: 2021-12-13

## 2022-03-19 PROBLEM — E87.5 HYPERKALEMIA: Status: ACTIVE | Noted: 2022-02-14

## 2022-03-19 PROBLEM — I85.00 ESOPHAGEAL VARICES (HCC): Status: ACTIVE | Noted: 2021-12-13

## 2022-03-19 PROBLEM — F10.11 ALCOHOL ABUSE, IN REMISSION: Status: ACTIVE | Noted: 2021-12-13

## 2022-03-19 PROBLEM — E87.1 HYPONATREMIA: Status: ACTIVE | Noted: 2022-01-23

## 2022-03-19 PROBLEM — R18.8 ASCITES: Status: ACTIVE | Noted: 2021-12-13

## 2022-03-20 PROBLEM — I48.0 PAROXYSMAL ATRIAL FIBRILLATION (HCC): Status: ACTIVE | Noted: 2021-12-13

## 2022-03-20 PROBLEM — K70.9 ALCOHOLIC LIVER DISEASE (HCC): Status: ACTIVE | Noted: 2021-12-13

## 2022-03-26 PROBLEM — Z95.828 S/P TIPS (TRANSJUGULAR INTRAHEPATIC PORTOSYSTEMIC SHUNT): Status: ACTIVE | Noted: 2022-03-26

## 2022-03-26 PROBLEM — E87.1 HYPONATREMIA: Status: RESOLVED | Noted: 2022-01-23 | Resolved: 2022-03-26

## 2022-03-26 NOTE — PROGRESS NOTES
[address] [facility]      Retta Hatchet, MD, Rossy Larsen, Jael Cruz MD, MPH      Joi Hernandez, PA-JULISSA Domingo, ACN-BC     April CASTILLO Vick, [facility]   Antoine Todd, FNP-C    Jalil Carmichael, [facility]       Beverly Meneses Harpal De [address]ter 136    at [address], [address] Ignacio Banks  22.    [phone]    FAX: [address] [place] [address] Drive, [address], [address]    [phone]    FAX: [phone]           Patient Care Team:  Giovanna Cutler MD as PCP - General (Internal Medicine)  Junito Shafer MD (Internal Medicine)      Problem List  Date Reviewed: 3/8/2022          Codes Class Noted    Hyperkalemia ICD-10-CM: E87.5  ICD-9-CM: 276.7  2/14/2022        Hyponatremia ICD-10-CM: E87.1  ICD-9-CM: 276.1  1/23/2022        Acid reflux ICD-10-CM: K21.9  ICD-9-CM: 530.81  Unknown        Alcoholic liver disease ([facility] ICD-10-CM: K70.9  ICD-9-CM: 571.3  12/13/2021        Cirrhosis ([facility] ICD-10-CM: K74.60  ICD-9-CM: 571.5  12/13/2021        Ascites ICD-10-CM: R18.8  ICD-9-CM: 789.59  12/13/2021        Esophageal varices ([facility] ICD-10-CM: I85.00  ICD-9-CM: 456.1  12/13/2021        Psoriasis ICD-10-CM: L40.9  ICD-9-CM: 696.1  12/13/2021        Paroxysmal atrial fibrillation ([facility] ICD-10-CM: I48.0  ICD-9-CM: 427.31  12/13/2021        Alcohol abuse, in remission ICD-10-CM: F10.11  ICD-9-CM: 305.03  12/13/2021              Priscila Schneider [facility] for management of 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 The active problem list, all pertinent past medical history, medications, liver histology, endoscopic studies,   radiologic findings and laboratory findings related to the liver disorder were reviewed and discussed with the patient. The patient is a 61 y.o.  male who was found to have chronic liver disease and cirrhosis in 3/2018 when he underwent liver biopsy. Serologic evaluation for markers of chronic liver disease was negative. The patient has developed the following complications of cirrhosis: esophageal varices, ascites, hyponatremia  Ascites is refractory to diuretics and he is getting paracentesis weekly. Since the last office appointment:  The patient was hospitalized at [facility] for hyponatremia  He came to [facility] for routine paracentesis, pre-TIPS consult and was found to have hyperK. He was hospitalized and underwent TIPS. PVPG pre-TIPS was 20 mm Hg. PSG after TIPS was 6 mmHg. The patient has the following symptoms which could be attributed to the liver disorder:    fatigue,     The patient is not experiencing the following symptoms which are commonly seen in this liver disorder:   problems concentrating,   swelling of the lower extremities,   hematemesis,   hematochezia. The patient has Mild limitations in functional activities which can be attributed to the liver disease       ASSESSMENT AND PLAN:  Cirrhosis  The diagnosis of cirrhosis is based upon imaging, laboratory studies, complications of cirrhosis. Cirrhosis is secondary to alcohol. The CTP is 9. Child class B. The MELD score is 24. Alcohol liver disease  The diagnosis is based upon a history of consuming significant alcohol on a daily basis for many years, liver biopsy, pattern of AST>ALT, an elevation in ferritin and FE saturation, serology that is negative for other causes of chronic liver disease. A liver biopsy performed in 3/2018 shows fatty liver consistent with alcohol etiology and Cirrhosis. The patient has been abstinent from alcohol since 3/2021. Elevation in Ferritin  There is an elevation in ferritin with Elevated iron saturation.     It is unlikely that the patient has hemochromatosis or is a carrier for an HFE gene.  HFE genetic testing was negative  The elevation in ferritin is secondary to alcohol use and will come down to normal with abstinence. Ascites   Ascites developed for the first time in 3/2021. Ascites has resolved following TIPS and with step 1/2 diuretics. Lower extremity edema  Edema has resolved following TIPS and with step 1/2 diuretics     Screening for Esophageal varices   The patient has had a TIPS placed. This will adequately decompress portal hypertension and esophageal varices if present prior to TIPS will resolve. EGD to screen for esophageal varices is no longer necessary. Hepatic encephalopathy   Overt HE has not developed to date. He was started on lactulose BID to reduce the risk of post-TIPS HE. Hyponatremia  This is secondary to cirrhosis and diuretics  THis was in the low 120s prior to TIPS. After TIPS Sna is 131. Anemia   This is due to multifactorial causes including portal hypertension with chronic GI blood loss, bone marrow suppression secondary to previous alcohol. The most recent FE studies were from 12/2021. The serum ferritin is 507  The FE saturation is 51    Thrombocytopenia   This is secondary to cirrhosis. There is no evidence of overt bleeding. No treatment is required. The platelet count is adequate for the patient to undergo procedures without the need for platelet transfusion or platelet growth factors. Screening for Hepatocellular Carcinoma  [address] screening has not been not been performed since 3/2021. AFP was ordered today and ultrasound will be scheduled. Treatment of other medical problems in patients with chronic liver disease  There are no contraindications for the patient to take most medications that are necessary for treatment of other medical issues. The patient has cirrhrosis and should avoid taking NSAIDs which are associated with a higher rate of developing TOMA.         The patient can take any medications utilized for treatment of DM, statins to treat hypercholesterolemia    Counseling for alcohol in patients with chronic liver disease  The patient was counseled regarding alcohol consumption and the effect of alcohol on chronic liver disease. The patient has not consumed alcohol since 3/2021. Osteoporosis  The risk of osteoporosis is increased in patients with cirrhosis. DEXA bone density to assess for osteoporosis has not been performed. This should be ordered by the patients primary care physician. Vaccinations   Vaccination for viral hepatitis A and B is recommended since the patient has no serologic evidence of previous exposure or vaccination with immunity. The patient has received 2 doses of COVID-19 vaccine. Routine vaccinations against other bacterial and viral agents can be performed as indicated. Annual flu vaccination should be administered if indicated. ALLERGIES  No Known Allergies    MEDICATIONS  Current Outpatient Medications   Medication Sig    lactulose (CHRONULAC) 10 gram/15 mL solution Take 30 mL by mouth three (3) times daily.  furosemide (LASIX) 20 mg tablet Take 1 Tablet by mouth daily.  spironolactone (ALDACTONE) 50 mg tablet Take 1 Tablet by mouth daily.  Eliquis 5 mg tablet     flecainide (TAMBOCOR) 100 mg tablet Take 50 mg by mouth every twelve (12) hours.  pantoprazole (PROTONIX) 20 mg tablet Take 20 mg by mouth daily.  sildenafil citrate (VIAGRA) 100 mg tablet take 1 tablet by mouth 1 hour prior to intercourse    ustekinaumab 90 mg/mL injection      No current facility-administered medications for this visit. SYSTEM REVIEW NOT RELATED TO LIVER DISEASE OR REVIEWED ABOVE:  Constitution systems: Negative for fever, chills, weight gain, weight loss. Eyes: Negative for visual changes. ENT: Negative for sore throat, painful swallowing. Respiratory: Negative for cough, hemoptysis, SOB. Cardiology: Negative for chest pain, palpitations.   GI:  Negative for constipation or diarrhea. : Negative for urinary frequency, dysuria, hematuria, nocturia. Skin: Negative for rash. Hematology: Negative for easy bruising, blood clots. Musculo-skelatal: Negative for back pain, muscle pain, weakness. Neurologic: Negative for headaches, dizziness, vertigo, memory problems not related to HE. Psychology: Negative for anxiety, depression. FAMILY HISTORY:  The father  of alcoholism. The mother  of pancreas cancer. There is no family history of liver disease. SOCIAL HISTORY:  The patient is . The patient has 2 children, 3 stepchildren,   The patient has never used tobacco products. The patient has previously consumed alcohol in excess. The patient has been abstinent from alcohol since 3/2021. The patient currently works full time as  for [employer]. PHYSICAL EXAMINATION:  Visit Vitals  BP (!) 80/50 (BP 1 Location: Left arm, BP Patient Position: Sitting, BP Cuff Size: Adult)   Pulse 85   Ht 6' 2\" (1.88 m)   Wt 229 lb 6.4 oz (104.1 kg)   SpO2 91%   BMI 29.45 kg/m²     General: No acute distress. Eyes: Sclera anicteric. ENT: No oral lesions. Thyroid normal.  Nodes: No adenopathy. Skin: No spider angiomata. No jaundice. No palmar erythema. Respiratory: Lungs clear to auscultation. Cardiovascular: Regular heart rate. No murmurs. No JVD. Abdomen: Soft non-tender. Liver size normal to percussion/palpation. Spleen not palpable. No obvious ascites. Extremities: No edema. No muscle wasting. No gross arthritic changes. Neurologic: Alert and oriented. Cranial nerves grossly intact. No asterixis.     LABORATORY STUDIES:  Liver [place] of [address] [place] [address] Units 2022   WBC 4.0 - 11.0 K/uL 9.7 5.0   ANC 1.8 - 7.7 K/uL 6.3 4.2   HGB 13.1 - 17.2 g/dL 9.8 (L) 9.3 (L)    - 440 K/uL 87 (L) 63 (L)   INR 0.89 - 1.29 1.41 (H)    AST 10 - 37 U/L 61 (H)    ALT 5 - 40 U/L 34 Alk Phos 40 - 125 U/L 180 (H)    Bili, Total 0.2 - 1.2 mg/dL 4.3 (H)    Bili, Direct 0.0 - 0.3 mg/dL 1.4 (H)    Albumin 3.5 - 5.0 g/dL 3.3 (L)    BUN 6 - 22 mg/dL 18 20 (H)   Creat 0.8 - 1.6 mg/dL 1.1 0.81   Na 133 - 145 mmol/L 131 (L) 130 (L)   K 3.5 - 5.5 mmol/L 3.9 4.8   Cl 98 - 110 mmol/L 96 (L) 99 (L)   CO2 20 - 32 mmol/L 25 20 (L)   Glucose 70 - 99 mg/dL 126 (H) 125 (H)   Magnesium 1.6 - 2.6 mg/dL     Ammonia 11 - 32 UMOL/L  30         SEROLOGIES:  Serologies Latest Ref Rng & Units 2021   Hep A Ab, Total Negative Negative   Hep B Surface Ag None Detec None Detected   Hep B Core Ab, Total None Detec None Detected   Hep B Surface Ab None Detec None Detected   Ferritin 22 - 322 ng/mL 507 (H)   Iron % Saturation 20 - 50 % 51 (H)   C-ANCA Neg:<1:20 titer <1:20   ANCA Neg:<1:20 titer <1:20   ASMCA 0 - 19 Units 16   M2 Ab 0.0 - 20.0 Units <20.0   Ceruloplasmin 16.0 - 31.0 mg/dL 26.9   Alpha-1 antitrypsin level 101 - 187 mg/dL 184     LIVER HISTOLOGY:  Not available or performed    ENDOSCOPIC PROCEDURES:  2021. EGD by Dr Patricia Cohn. Small esophageal varices. NO banding. RADIOLOGY:  3/2021. CT scan abdomen with IV contrast.  Changes consistent with cirrhosis. No liver mass lesions. No dilated bile ducts. Small ascites. OTHER TESTIN2022. TIPS placement. Pre-TIPS. Free HV 2, wedge HV 22, HVPG=20 mm Hg. Post-TIPS. RA 5, PP 11, PSG= 6 mm Hg. FOLLOW-UP:  All of the issues listed above in the Assessment and Plan were discussed with the patient. All questions were answered. The patient expressed a clear understanding of the above. [facility] [place] [address] in 4 weeks to review all data and determine the treatment plan.       Octavia Gipson MD  [facility] [address], [address] [place] [address] 22.  [phone]  [address] [place] [address]

## 2022-03-31 DIAGNOSIS — K70.31 ALCOHOLIC CIRRHOSIS OF LIVER WITH ASCITES (HCC): Primary | ICD-10-CM

## 2022-04-01 ENCOUNTER — TELEPHONE (OUTPATIENT)
Dept: HEMATOLOGY | Age: 67
End: 2022-04-01

## 2022-04-01 NOTE — TELEPHONE ENCOUNTER
Spoke with patient's wife, Sam Lantigua, and reviewed liver transplant evaluation testing. I also sent her a [employer] message with the information. Faxed medical records request to [facility] at [phone]. Called Gastroenterology, [address] [place] [address] ([phone]) and requested colonoscopy records. Called dental office (Dr. Lindsey Gore; [phone]) to request fax number. Office is closed, will call back next week.

## 2022-04-06 ENCOUNTER — OFFICE VISIT (OUTPATIENT)
Dept: HEMATOLOGY | Age: 67
End: 2022-04-06
Payer: COMMERCIAL

## 2022-04-06 ENCOUNTER — HOSPITAL ENCOUNTER (OUTPATIENT)
Dept: LAB | Age: 67
Discharge: HOME | End: 2022-04-06

## 2022-04-06 VITALS
HEART RATE: 81 BPM | OXYGEN SATURATION: 98 % | WEIGHT: 227.5 LBS | TEMPERATURE: 97.1 F | SYSTOLIC BLOOD PRESSURE: 121 MMHG | BODY MASS INDEX: 29.21 KG/M2 | DIASTOLIC BLOOD PRESSURE: 66 MMHG

## 2022-04-06 DIAGNOSIS — K70.30 ALCOHOLIC CIRRHOSIS OF LIVER WITHOUT ASCITES (HCC): Primary | ICD-10-CM

## 2022-04-06 LAB
ETHANOL SERPL-MCNC: <3 MG/DL (ref 0–3)
SENTARA SPECIMEN COL,SENBCF: NORMAL

## 2022-04-06 PROCEDURE — 99001 SPECIMEN HANDLING PT-LAB: CPT

## 2022-04-06 PROCEDURE — 82077 ASSAY SPEC XCP UR&BREATH IA: CPT

## 2022-04-06 PROCEDURE — 99215 OFFICE O/P EST HI 40 MIN: CPT | Performed by: INTERNAL MEDICINE

## 2022-04-06 NOTE — PROGRESS NOTES
0450 [facility], MD, [address], [place], [place]      OTTO Rivera, [facility]     Wanda Vick, [facility]   VANCE FlynnP-JULISSA Treviño, [facility]       Beverly Meneses [address]    at [address], [address] [place] [address], [facility] 22.    [phone]    FAX: [address] [place] [address]    at [address], [address]    [phone]    FAX: [phone]       Patient Care Team:  Andra Thomas MD as PCP - General (Internal Medicine)  Nisha Hancock MD (Internal Medicine)  Mor Eid RN as Nurse Navigator (Hepatology)      Problem List  Date Reviewed: 3/26/2022          Codes Class Noted    S/P TIPS (transjugular intrahepatic portosystemic shunt) ICD-10-CM: Y71.603  ICD-9-CM: V45.89  3/26/2022        Hyperkalemia ICD-10-CM: E87.5  ICD-9-CM: 276.7  2/14/2022        Acid reflux ICD-10-CM: K21.9  ICD-9-CM: 530.81  Unknown        Alcoholic liver disease ([facility] ICD-10-CM: K70.9  ICD-9-CM: 571.3  12/13/2021        Cirrhosis ([facility] ICD-10-CM: K74.60  ICD-9-CM: 571.5  12/13/2021        Ascites ICD-10-CM: R18.8  ICD-9-CM: 789.59  12/13/2021        Esophageal varices ([facility] ICD-10-CM: I85.00  ICD-9-CM: 456.1  12/13/2021        Psoriasis ICD-10-CM: L40.9  ICD-9-CM: 696.1  12/13/2021        Paroxysmal atrial fibrillation ([facility] ICD-10-CM: I48.0  ICD-9-CM: 427.31  12/13/2021        Alcohol abuse, in remission ICD-10-CM: F10.11  ICD-9-CM: 305.03  12/13/2021              Azucena Rodriguez is being seen at [address] for management of 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   The active problem list, all pertinent past medical history, medications, liver histology, endoscopic studies,   radiologic findings and laboratory findings related to the liver disorder were reviewed and discussed with the patient.       The patient is a 61 y.o.  male who was found to have chronic liver disease and cirrhosis in 3/2018 when he underwent liver biopsy.       Serologic evaluation for markers of chronic liver disease was negative.     The patient has developed the following complications of cirrhosis: esophageal varices, ascites, hyponatremia    Ascites was refractory to diuretics and treated with TIPS in 2/2022. The patient has the following symptoms which could be attributed to the liver disorder:    fatigue,   Mild confusion since TIPS  Painful umbilical hernia    The patient is not experiencing the following symptoms which are commonly seen in this liver disorder:   Ascites   Lower extremity edema  hematemesis,   hematochezia.     The patient has Mild limitations in functional activities which can be attributed to the liver disease        ASSESSMENT AND PLAN:  Cirrhosis  The diagnosis of cirrhosis is based upon imaging, laboratory studies, complications of cirrhosis. Cirrhosis is secondary to alcohol.        The CTP is 9. Child class B. The MELD score hs come down to 19. Will initiate liver transplant evaluation with cardiac catheterization     Alcohol liver disease  The diagnosis is based upon a history of consuming significant alcohol on a daily basis for many years, liver biopsy, pattern of AST>ALT, an elevation in ferritin and FE saturation, serology that is negative for other causes of chronic liver disease. A liver biopsy in 3/2018 shows fatty liver consistent with alcohol etiology and Cirrhosis. The patient has been abstinent from alcohol since 3/2021.     Elevation in Ferritin  There is an elevation in ferritin with Elevated iron saturation. It is unlikely that the patient has hemochromatosis or is a carrier for an HFE gene.   HFE genetic testing was negative  The elevation in ferritin is secondary to alcohol use and will come down to normal with abstinence.     Ascites   Ascites developed for the first time in 3/2021. Ascites has resolved following TIPS and with step 1/2 diuretics.       Lower extremity edema  Edema has resolved following TIPS and with step 1/2 diuretics      Screening for Esophageal varices   The patient has had a TIPS placed. This will adequately decompress portal hypertension and esophageal varices if present prior to TIPS will resolve. EGD to screen for esophageal varices is no longer necessary.     Hepatic encephalopathy   Overt HE developed after TIPS placement in 2/2022. He was started on lactulose TID to reduce the risk of post-TIPS HE. Will add xifaxan BID     Hyponatremia  This is secondary to cirrhosis and diuretics  THis has resolved following TIPS.    Anemia   This is due to multifactorial causes including portal hypertension with chronic GI blood loss, bone marrow suppression secondary to previous alcohol. The most recent FE studies were from 12/2021. The serum ferritin is 507  The FE saturation is 51     Thrombocytopenia   This is secondary to cirrhosis. There is no evidence of overt bleeding. No treatment is required. The platelet count is adequate for the patient to undergo procedures without the need for platelet transfusion or platelet growth factors.     Screening for Hepatocellular Carcinoma  HCC screening has not been not been performed since 3/2021.   AFP was ordered today and ultrasound will be scheduled.     Treatment of other medical problems in patients with chronic liver disease  There are no contraindications for the patient to take most medications that are necessary for treatment of other medical issues.     The patient has cirrhrosis and should avoid taking NSAIDs which are associated with a higher rate of developing TOMA.         The patient can take any medications utilized for treatment of DM, statins to treat hypercholesterolemia     Counseling for alcohol in patients with chronic liver disease  The patient was counseled regarding alcohol consumption and the effect of alcohol on chronic liver disease. The patient has not consumed alcohol since 3/2021.     Osteoporosis  The risk of osteoporosis is increased in patients with cirrhosis. DEXA bone density to assess for osteoporosis has not been performed. This should be ordered by the patients primary care physician.     Vaccinations   Vaccination for viral hepatitis A and B is recommended since the patient has no serologic evidence of previous exposure or vaccination with immunity.     The patient has received 2 doses of COVID-19 vaccine.       Routine vaccinations against other bacterial and viral agents can be performed as indicated. Annual flu vaccination should be administered if indicated. ALLERGIES  No Known Allergies    MEDICATIONS:  Current Outpatient Medications   Medication Sig    lactulose (CHRONULAC) 10 gram/15 mL solution Take 30 mL by mouth three (3) times daily.  furosemide (LASIX) 20 mg tablet Take 1 Tablet by mouth daily.  spironolactone (ALDACTONE) 50 mg tablet Take 1 Tablet by mouth daily.  Eliquis 5 mg tablet     flecainide (TAMBOCOR) 100 mg tablet Take 50 mg by mouth every twelve (12) hours.  pantoprazole (PROTONIX) 20 mg tablet Take 20 mg by mouth daily.  sildenafil citrate (VIAGRA) 100 mg tablet take 1 tablet by mouth 1 hour prior to intercourse    ustekinaumab 90 mg/mL injection      No current facility-administered medications for this visit. SYSTEM REVIEW NOT RELATED TO LIVER DISEASE OR REVIEWED ABOVE:  Constitution systems: Negative for fever, chills, weight gain, weight loss. Eyes: Negative for visual changes. ENT: Negative for sore throat, painful swallowing. Respiratory: Negative for cough, hemoptysis, SOB. Cardiology: Negative for chest pain, palpitations. GI:  Negative for constipation or diarrhea.   : Negative for urinary frequency, dysuria, hematuria, nocturia. Skin: Negative for rash. Hematology: Negative for easy bruising, blood clots. Musculo-skelatal: Negative for back pain, muscle pain, weakness. Neurologic: Negative for headaches, dizziness, vertigo, memory problems not related to HE. Psychology: Negative for anxiety, depression.         FAMILY HISTORY:  The father  of alcoholism. The mother  of pancreas cancer. There is no family history of liver disease.       SOCIAL HISTORY:  The patient is . The patient has 2 children, 3 stepchildren,   The patient has never used tobacco products. The patient has previously consumed alcohol in excess. The patient has been abstinent from alcohol since 3/2021. The patient currently works full time as  for [employer].          PHYSICAL EXAMINATION:  Visit Vitals  Visit Vitals  /66   Pulse 81   Temp 97.1 °F (36.2 °C) (Tympanic)   Wt 227 lb 8 oz (103.2 kg)   SpO2 98%   BMI 29.21 kg/m²     PHYSICAL EXAMINATION:  Visit Vitals  /66   Pulse 81   Temp 97.1 °F (36.2 °C) (Tympanic)   Wt 227 lb 8 oz (103.2 kg)   SpO2 98%   BMI 29.21 kg/m²       General: No acute distress. Eyes: Sclera anicteric. ENT: No oral lesions. Thyroid normal.  Nodes: No adenopathy. Skin: No spider angiomata. No jaundice. No palmar erythema. Respiratory: Lungs clear to auscultation. Cardiovascular: Regular heart rate. No murmurs. No JVD. Abdomen: Soft non-tender, liver size normal to percussion/palpation. Spleen not palpable. No obvious ascites. Extremities: 2+ lower edema. No muscle wasting. Neurologic: Alert and oriented. Cranial nerves grossly intact. No asterixis.     LABORATORY STUDIES:  Liver [place] of [address] Units 2022   WBC 4.0 - 11.0 K/uL 6.5    ANC 1.8 - 7.7 K/uL 4.4    HGB 13.1 - 17.2 g/dL 10.7 (L)     - 440 K/uL 72 (L)    INR 0.89 - 1.29 1.36 (H) 2.0 (H)   AST 10 - 37 U/L 67 (H) 67 (H)   ALT 5 - 40 U/L 25 33   Alk Phos 40 - 125 U/L 224 (H) 220 (H)   Bili, Total 0.2 - 1.2 mg/dL 4.5 (H) 6.2 (H)   Bili, Direct 0.0 - 0.3 mg/dL 2.1 (H)    Albumin 3.5 - 5.0 g/dL 3.4 (L) 3.0 (L)   BUN 6 - 22 mg/dL 6 7   Creat 0.8 - 1.6 mg/dL 0.6 (L) 0.9   Na 133 - 145 mmol/L 133 132 (L)   K 3.5 - 5.5 mmol/L 4.1 4.3   Cl 98 - 110 mmol/L 98 98   CO2 20 - 32 mmol/L 24 27   Glucose 70 - 99 mg/dL 98 131 (H)   //  SEROLOGIES:  Serologies Latest Ref Rng & Units 2021   Hep A Ab, Total Negative Negative   Hep B Surface Ag None Detec None Detected   Hep B Core Ab, Total None Detec None Detected   Hep B Surface Ab None Detec None Detected   Ferritin 22 - 322 ng/mL 507 (H)   Iron % Saturation 20 - 50 % 51 (H)   C-ANCA Neg:<1:20 titer <1:20   ANCA Neg:<1:20 titer <1:20   ASMCA 0 - 19 Units 16   M2 Ab 0.0 - 20.0 Units <20.0   Ceruloplasmin 16.0 - 31.0 mg/dL 26.9   Alpha-1 antitrypsin level 101 - 187 mg/dL 184        LIVER HISTOLOGY:  Not available or performed     ENDOSCOPIC PROCEDURES:  2021. EGD by Dr Angela Keenan. Small esophageal varices. NO banding.     RADIOLOGY:  3/2021. CT scan abdomen with IV contrast.  Changes consistent with cirrhosis. No liver mass lesions. No dilated bile ducts. Small ascites.     OTHER TESTIN2022. TIPS placement. Pre-TIPS. Free HV 2, wedge HV 22, HVPG=20 mm Hg. Post-TIPS. RA 5, PP 11, PSG= 6 mm Hg.     FOLLOW-UP:  All of the issues listed above in the Assessment and Plan were discussed with the patient. All questions were answered.   The patient expressed a clear understanding of the above.     Follow-up [address] in 4 weeks to review all data and determine the treatment plan.        MD Yair Merida [address] [facility] 22.  [phone]  [address]

## 2022-04-06 NOTE — Clinical Note
4/26/2022    Patient: Shaggy Martins   YOB: 1955   Date of Visit: 4/6/2022     Adolfo Kovacs, [address] [address]  [facility] [facility] [address]    Dear Adolfo Kovacs MD,      Thank you for referring Mr. Koki West to [address] [place] [address],[address] for evaluation. My notes for this consultation are attached. If you have questions, please do not hesitate to call me. I look forward to following your patient along with you.       Sincerely,    Malinda Dillard MD

## 2022-04-07 ENCOUNTER — TELEPHONE (OUTPATIENT)
Dept: HEMATOLOGY | Age: 67
End: 2022-04-07

## 2022-04-07 LAB
A-G RATIO,AGRAT: 1.1 RATIO (ref 1.1–2.6)
ABSOLUTE LYMPHOCYTE COUNT, 10803: 1 K/UL (ref 1–4.8)
ALBUMIN SERPL-MCNC: 3.4 G/DL (ref 3.5–5)
ALP SERPL-CCNC: 224 U/L (ref 40–125)
ALT SERPL-CCNC: 25 U/L (ref 5–40)
ANION GAP SERPL CALC-SCNC: 11 MMOL/L (ref 3–15)
AST SERPL W P-5'-P-CCNC: 67 U/L (ref 10–37)
BASOPHILS # BLD: 0.1 K/UL (ref 0–0.2)
BASOPHILS NFR BLD: 2 % (ref 0–2)
BILIRUB SERPL-MCNC: 4.5 MG/DL (ref 0.2–1.2)
BILIRUBIN, DIRECT,CBIL: 2.1 MG/DL (ref 0–0.3)
BUN SERPL-MCNC: 6 MG/DL (ref 6–22)
CALCIUM SERPL-MCNC: 8.8 MG/DL (ref 8.4–10.5)
CHLORIDE SERPL-SCNC: 98 MMOL/L (ref 98–110)
CO2 SERPL-SCNC: 24 MMOL/L (ref 20–32)
CREAT SERPL-MCNC: 0.6 MG/DL (ref 0.8–1.6)
EBV EARLY ANTIGEN AB,IGG, 096251: <0.2 AI
EBV NA IGG SER QL: 7.7 AI
EBV VCA IGG SER QL: >8 AI
EOSINOPHIL # BLD: 0.3 K/UL (ref 0–0.5)
EOSINOPHIL NFR BLD: 5 % (ref 0–6)
EPSTEIN-BARR VIRUS-VCA AB IGM: 0.2 AI
ERYTHROCYTE [DISTWIDTH] IN BLOOD BY AUTOMATED COUNT: 15 % (ref 10–15.5)
GFRAA, 66117: >60
GFRNA, 66118: >60
GLOBULIN,GLOB: 3.2 G/DL (ref 2–4)
GLUCOSE SERPL-MCNC: 98 MG/DL (ref 70–99)
GRANULOCYTES,GRANS: 67 % (ref 40–75)
HCT VFR BLD AUTO: 33 % (ref 39.3–51.6)
HGB BLD-MCNC: 10.7 G/DL (ref 13.1–17.2)
HIV -1/0/2 AG/AB WITH REFLEX, 13463: NON REACTIVE
HIV 1 & 2 AB SER-IMP: NORMAL
IMMATURE PLATELET FRACTION: 4.9 % (ref 1.1–7.1)
INR PPP: 1.36 (ref 0.89–1.29)
LYMPHOCYTES, LYMLT: 15 % (ref 20–45)
Lab: 0.5 AI
Lab: 0.7 AI
MACROCYTOSIS,MACRO: ABNORMAL
MCH RBC QN AUTO: 38 PG (ref 26–34)
MCHC RBC AUTO-ENTMCNC: 32 G/DL (ref 31–36)
MCV RBC AUTO: 118 FL (ref 80–95)
MONOCYTES # BLD: 0.8 K/UL (ref 0.1–1)
MONOCYTES NFR BLD: 12 % (ref 3–12)
NEUTROPHILS # BLD AUTO: 4.4 K/UL (ref 1.8–7.7)
NORMACHROMIC RBC, 868: ABNORMAL
PLATELET # BLD AUTO: 72 K/UL (ref 140–440)
PMV BLD AUTO: 10.8 FL (ref 9–13)
POLYCHROMASIA,POLYCM: ABNORMAL
POTASSIUM SERPL-SCNC: 4.1 MMOL/L (ref 3.5–5.5)
PROT SERPL-MCNC: 6.6 G/DL (ref 6.2–8.1)
PROTHROMBIN TIME: 13.9 SEC (ref 9–13)
RBC # BLD AUTO: 2.79 M/UL (ref 3.8–5.8)
SMEAR EVAL, 1131: ABNORMAL
SODIUM SERPL-SCNC: 133 MMOL/L (ref 133–145)
SYPHILIS (T.PALLIDUM) IGG/IGM: NON REACTIVE
VZV IGM SER IA-ACNC: >8 AI
WBC # BLD AUTO: 6.5 K/UL (ref 4–11)

## 2022-04-07 NOTE — TELEPHONE ENCOUNTER
Received a call from the lab at [facility] regarding drawing the Quantiferon TB Plus lab. They were unable to draw it. Called pt and informed him of the above and recommended he come to Dr. Sales Tim office to have this lab draw d/t the intricacies of processing it. Pt agreed to come any day between M-W.

## 2022-04-07 NOTE — TELEPHONE ENCOUNTER
Faxed dental clearance letter to Dr. Chely Bland office at [phone]. Scheduled appointment with Cielo Mishra NP [facility]) on 4/12/2022. Faxed records to [phone]. Called patient's wife, no answer. I left a message letting her know I've scheduled an appointment with cardiology and I'd send a [employer] message with details.

## 2022-04-08 LAB
AFP, SERUM, 141303: 6 NG/ML (ref 0–8.4)
AFP-L3%, SERUM, 141302: 7.3 % (ref 0–9.9)

## 2022-04-09 LAB
GAMMA INTERFERON BACKGROUND BLD IA-ACNC: 0.04 IU/ML
M TB IFN-G BLD-IMP: NEGATIVE
M TB IFN-G CD4+ BCKGRND COR BLD-ACNC: 0.06 IU/ML
MITOGEN IGNF BLD-ACNC: 2.26 IU/ML
QUANTIFERON TB2 AG: 0.05 IU/ML
SERVICE CMNT-IMP: NORMAL

## 2022-04-11 ENCOUNTER — HOSPITAL ENCOUNTER (OUTPATIENT)
Dept: LAB | Age: 67
Discharge: HOME | End: 2022-04-11

## 2022-04-11 LAB
PATH REVIEW OF SMEAR, 12050: NORMAL
RESULTS FOR MISC TEST: NORMAL
SENT TO, 434: NORMAL
TEST NAME, 435: NORMAL
XX-LABCORP SPECIMEN COL,LCBCF: NORMAL

## 2022-04-11 PROCEDURE — 99001 SPECIMEN HANDLING PT-LAB: CPT

## 2022-04-14 LAB
GAMMA INTERFERON BACKGROUND BLD IA-ACNC: 0.04 IU/ML
M TB IFN-G BLD-IMP: NEGATIVE
M TB IFN-G CD4+ BCKGRND COR BLD-ACNC: 0.03 IU/ML
MITOGEN IGNF BLD-ACNC: 6.17 IU/ML
QUANTIFERON TB2 AG: 0.03 IU/ML
SERVICE CMNT-IMP: NORMAL

## 2022-04-15 LAB
RESULTS FOR MISC TEST: NORMAL
SENT TO, 434: NORMAL
TEST NAME, 435: NORMAL

## 2022-05-14 RX ORDER — FUROSEMIDE 20 MG/1
20 TABLET ORAL DAILY
Qty: 30 TABLET | Refills: 0 | Status: SHIPPED | OUTPATIENT
Start: 2022-05-14

## 2022-05-16 ENCOUNTER — TELEPHONE (OUTPATIENT)
Dept: HEMATOLOGY | Age: 67
End: 2022-05-16

## 2022-05-16 NOTE — TELEPHONE ENCOUNTER
Noted via Care Everywhere that pt is undergoing liver transplant evaluation at [facility] in [place]. Called pt to find out if he relocated. Wife said they're pursuing listing in [address] because pt intends to retire next year and they have another home (in [place]) a few hours away from the transplant center. Pt/wife also wanted to explore transplant at [facility] because MMaT is 23; therefore, he'd likely undergo transplant sooner. If pt is listed at [facility], he may not pursue listing in [address] [facility]. Pt/wife want to keep appointment with Dr. Jessi White as planned in 7/2022. I asked wife to keep me updated on pts status at [facility] and told her if pt is in [address] [facility] and needs to be seen, we can work him in.

## 2022-06-10 RX ORDER — LACTULOSE 10 G/15ML
SOLUTION ORAL; RECTAL
Qty: 946 ML | Refills: 3 | Status: SHIPPED | OUTPATIENT
Start: 2022-06-10

## 2022-07-22 ENCOUNTER — TELEPHONE (OUTPATIENT)
Dept: HEMATOLOGY | Age: 67
End: 2022-07-22

## 2022-07-22 NOTE — TELEPHONE ENCOUNTER
Noted pt cancelled appointment with Dr. Parish Simms on 7/13/2022 and did not reschedule. Per review of [place] records through [facility], pt is approved for listing with contingency. Called pts wife, no answer. Left VM asking for a call back to confirm no follow up with Dr. Parish Simms is needed.

## 2022-12-29 ENCOUNTER — DOCUMENTATION ONLY (OUTPATIENT)
Dept: HEMATOLOGY | Age: 67
End: 2022-12-29

## 2022-12-29 NOTE — PROGRESS NOTES
Noted through [facility] records that patient underwent liver transplant at [facility], [address] [facility] on 10/12/2022.

## 2023-01-31 DIAGNOSIS — K70.31 ASCITES DUE TO ALCOHOLIC CIRRHOSIS (HCC): Primary | ICD-10-CM

## 2023-01-31 RX ORDER — FLECAINIDE ACETATE 100 MG/1
50 TABLET ORAL EVERY 12 HOURS
COMMUNITY

## 2023-01-31 RX ORDER — SPIRONOLACTONE 50 MG/1
50 TABLET, FILM COATED ORAL DAILY
COMMUNITY
Start: 2022-02-17

## 2023-01-31 RX ORDER — PANTOPRAZOLE SODIUM 20 MG/1
20 TABLET, DELAYED RELEASE ORAL DAILY
COMMUNITY

## 2023-01-31 RX ORDER — LACTULOSE 10 G/15ML
SOLUTION ORAL
COMMUNITY
Start: 2022-06-10

## 2023-01-31 RX ORDER — SILDENAFIL 100 MG/1
TABLET, FILM COATED ORAL
COMMUNITY
Start: 2021-08-12

## 2023-01-31 RX ORDER — USTEKINUMAB 90 MG/ML
INJECTION, SOLUTION SUBCUTANEOUS
COMMUNITY
Start: 2021-12-09

## 2023-01-31 RX ORDER — FUROSEMIDE 20 MG/1
20 TABLET ORAL DAILY
COMMUNITY
Start: 2022-05-14

## 2023-02-03 DIAGNOSIS — K70.31 ASCITES DUE TO ALCOHOLIC CIRRHOSIS (HCC): Primary | ICD-10-CM

## 2023-02-06 DIAGNOSIS — K70.31 ASCITES DUE TO ALCOHOLIC CIRRHOSIS (HCC): Primary | ICD-10-CM
